# Patient Record
Sex: FEMALE | Race: WHITE | NOT HISPANIC OR LATINO | Employment: FULL TIME | URBAN - METROPOLITAN AREA
[De-identification: names, ages, dates, MRNs, and addresses within clinical notes are randomized per-mention and may not be internally consistent; named-entity substitution may affect disease eponyms.]

---

## 2017-04-26 ENCOUNTER — RADIANT APPOINTMENT (OUTPATIENT)
Dept: GENERAL RADIOLOGY | Facility: CLINIC | Age: 47
End: 2017-04-26
Attending: FAMILY MEDICINE
Payer: COMMERCIAL

## 2017-04-26 ENCOUNTER — OFFICE VISIT (OUTPATIENT)
Dept: FAMILY MEDICINE | Facility: CLINIC | Age: 47
End: 2017-04-26
Payer: COMMERCIAL

## 2017-04-26 VITALS
BODY MASS INDEX: 34.96 KG/M2 | TEMPERATURE: 98 F | OXYGEN SATURATION: 99 % | HEIGHT: 69 IN | SYSTOLIC BLOOD PRESSURE: 130 MMHG | RESPIRATION RATE: 16 BRPM | DIASTOLIC BLOOD PRESSURE: 84 MMHG | HEART RATE: 70 BPM | WEIGHT: 236 LBS

## 2017-04-26 DIAGNOSIS — M51.26 DISPLACEMENT OF LUMBAR INTERVERTEBRAL DISC WITHOUT MYELOPATHY: Primary | ICD-10-CM

## 2017-04-26 DIAGNOSIS — M25.551 HIP PAIN, BILATERAL: ICD-10-CM

## 2017-04-26 DIAGNOSIS — M25.859 FEMORAL ACETABULAR IMPINGEMENT: Primary | ICD-10-CM

## 2017-04-26 DIAGNOSIS — M25.552 HIP PAIN, BILATERAL: ICD-10-CM

## 2017-04-26 DIAGNOSIS — M54.30 SCIATICA, UNSPECIFIED LATERALITY: ICD-10-CM

## 2017-04-26 DIAGNOSIS — M25.561 PAIN IN BOTH KNEES, UNSPECIFIED CHRONICITY: ICD-10-CM

## 2017-04-26 DIAGNOSIS — M25.562 PAIN IN BOTH KNEES, UNSPECIFIED CHRONICITY: ICD-10-CM

## 2017-04-26 PROCEDURE — 99213 OFFICE O/P EST LOW 20 MIN: CPT | Performed by: FAMILY MEDICINE

## 2017-04-26 PROCEDURE — 73523 X-RAY EXAM HIPS BI 5/> VIEWS: CPT

## 2017-04-26 RX ORDER — IBUPROFEN 200 MG
200 TABLET ORAL EVERY 4 HOURS PRN
COMMUNITY
End: 2020-01-22

## 2017-04-26 NOTE — NURSING NOTE
"Chief Complaint   Patient presents with     Musculoskeletal Problem     pt has extensive history of back problems and has had back surgerys. Has seen spine specialists, completed PT, but was talking to brother who was having same problems- not being able to stand for long periods of time, cant sit for long periods of time, pain gets worse as the day goes on, bilateral hip pain. Would like to have x-rays of hips today        Initial /84 (BP Location: Right arm, Patient Position: Chair, Cuff Size: Adult Large)  Pulse 70  Temp 98  F (36.7  C) (Oral)  Resp 16  Ht 5' 9\" (1.753 m)  Wt 236 lb (107 kg)  SpO2 99%  BMI 34.85 kg/m2 Estimated body mass index is 34.85 kg/(m^2) as calculated from the following:    Height as of this encounter: 5' 9\" (1.753 m).    Weight as of this encounter: 236 lb (107 kg).  Medication Reconciliation: complete     Vero Rothman CMA      "

## 2017-04-26 NOTE — PROGRESS NOTES
SUBJECTIVE:                                                    Taylor Dunlap is a 46 year old female who presents to clinic today for the following health issues:    About 2 years ago started meggan and got knee pains so stopped doing this.   She then began to have more back issues.   She saw chiropractor and it did not help.   She saw another and that did not help.   Her knees kept hurting.   She got MRI on back and she was found to have 2 herniated disc in her back.   She got PT and it did help.   That was done about 6-8 months ago.   If she stands too long cooking or cleaning for too long then sitting afterwards it will really hurt.   Her brother mentioned that he had the same trouble before his hips were replaced at age late 40's.      pt has extensive history of back problems and has had back surgerys. Has seen spine specialists, completed PT, but was talking to brother who was having same problems- not being able to stand for long periods of time, cant sit for long periods of time, pain gets worse as the day goes on, bilateral hip pain. Would like to have x-rays of hips today         Joint Pain     Onset: 2 years ago, started with knees    Description:   Location: left knee, right knee, left hip and right hip    Character: Dull ache, can be a stabbing pain, usually in right leg    Intensity: mild    Progression of Symptoms: same    Accompanying Signs & Symptoms:  Other symptoms: radiation of pain to foot   History:   Previous similar pain: YES      Precipitating factors:   Trauma or overuse: no     Alleviating factors:  Improved by: Epison Son baths, Water areobics, excedrin or motrin       Therapies Tried and outcome: Epison Salt baths, being in the water, motrin      Past Medical History:   Diagnosis Date     NO ACTIVE PROBLEMS      Skin cancer, basal cell 2013       Past Surgical History:   Procedure Laterality Date     C NONSPECIFIC PROCEDURE  4/99    L4-5 laminectomy & microdiscectomy ([facility])  "      MEDICATIONS:  Current Outpatient Prescriptions   Medication     Aspirin-Acetaminophen-Caffeine (EXCEDRIN PO)     ibuprofen (MOTRIN IB) 200 MG tablet     No current facility-administered medications for this visit.        SOCIAL HISTORY:  Social History   Substance Use Topics     Smoking status: Current Every Day Smoker     Packs/day: 0.50     Years: 10.00     Types: Cigarettes     Last attempt to quit: 4/1/2009     Smokeless tobacco: Never Used     Alcohol use Yes      Comment: occasional; couple glasses wine on weekends.       Family History   Problem Relation Age of Onset     Arthritis Brother      CEREBROVASCULAR DISEASE Maternal Grandfather      C.A.D. Paternal Grandfather      MI; 50's     Arthritis Paternal Grandmother      RA     Brain Cancer Paternal Aunt      R.A     Arthritis Paternal Aunt        Objective:  Blood pressure 130/84, pulse 70, temperature 98  F (36.7  C), temperature source Oral, resp. rate 16, height 5' 9\" (1.753 m), weight 236 lb (107 kg), SpO2 99 %, unknown if currently breastfeeding.  Neck:  There is no lymphadenopathy or thyroid tenderness or enlargement  Chest: Clear to auscultation bilaterally.  No wheezes, rales or retractions.  CV: Regular rate and rhythm without murmurs, rubs or gallops.  Right Hip exam: the right hip has full range of motion with no pain.  There is no pain on internal rotation and none pain with external rotation.  There is no pain at the trochanteric bursa - the left hip exam is normal as well    Xray of both hips: some narrowing of the hip joint at inferior socket both sides but that is all    Assessment:  1. ? Early DJD of hips but no real pain here  2. Ongoing and chronic low back pain - reported herniated discs in lumbar spine  3.  Knee pain intermittent which is likely DJD as well  4. HCM - due for pap and CPE    Plan:  1. Refer to medical spine clinic for medical management   2. Will need to get copy of MRI for them  3.  Will let her know when we get " official reading of hip xray  4.  Schedule appt for CPE and pap sometime in next 1-2 months

## 2017-04-26 NOTE — MR AVS SNAPSHOT
After Visit Summary   4/26/2017    Taylor Dunlap    MRN: [id number]           Patient Information     Date Of Birth          1970        Visit Information        Provider Department      4/26/2017 11:15 AM Shereen Garcia MD [facility]        Today's Diagnoses     Displacement of lumbar intervertebral disc without myelopathy    -  1    Hip pain, bilateral           Follow-ups after your visit        Additional Services     ORTHO  REFERRAL       [facility] is referring you to the Orthopedic  Services at [facility].       The  Representative will assist you in the coordination of your Orthopedic and Musculoskeletal Care as prescribed by your physician.    The  Representative will call you within 1 business day to help schedule your appointment, or you may contact the  Representative at:    All areas ~ [phone]     Type of Referral : Spine: Lumbar  **Choose Medical Spine Specialist (unless patient was seen by a Medical Spine Specialist within the past 6 months).**  Surgical Evaluation is advised if the patient presents with one or more of the following red flags: Evidence of Spinal Tumor, Infection or Fracture, Cauda Equina Syndrome, Sudden or Progressive Weakness, Loss of Bowel or Bladder Control, or any other documented emergent neurological condition resulting from a Lumbar Spinal Condition. Medical Spine Specialist        Timeframe requested: Within 2 weeks    Coverage of these services is subject to the terms and limitations of your health insurance plan.  Please call member services at your health plan with any benefit or coverage questions.      If X-rays, CT or MRI's have been performed, please contact the facility where they were done to arrange for , prior to your scheduled appointment.  Please bring this referral request to your appointment and present it to your specialist.    "               Who to contact     If you have questions or need follow up information about today's clinic visit or your schedule please contact [facility] directly at [phone].  Normal or non-critical lab and imaging results will be communicated to you by MyChart, letter or phone within 4 business days after the clinic has received the results. If you do not hear from us within 7 days, please contact the clinic through MyChart or phone. If you have a critical or abnormal lab result, we will notify you by phone as soon as possible.  Submit refill requests through [employer] or call your pharmacy and they will forward the refill request to us. Please allow 3 business days for your refill to be completed.          Additional Information About Your Visit        [employer] Information     [employer] gives you secure access to your electronic health record. If you see a primary care provider, you can also send messages to your care team and make appointments. If you have questions, please call your primary care clinic.  If you do not have a primary care provider, please call [phone] and they will assist you.        Care EveryWhere ID     This is your Care EveryWhere ID. This could be used by other organizations to access your [place] medical records  [id number]        Your Vitals Were     Pulse Temperature Respirations Height Pulse Oximetry BMI (Body Mass Index)    70 98  F (36.7  C) (Oral) 16 5' 9\" (1.753 m) 99% 34.85 kg/m2       Blood Pressure from Last 3 Encounters:   04/26/17 130/84   07/10/13 110/78   03/20/12 124/84    Weight from Last 3 Encounters:   04/26/17 236 lb (107 kg)   07/10/13 200 lb (90.7 kg)   03/20/12 224 lb (101.6 kg)              We Performed the Following     ORTHO  REFERRAL        Primary Care Provider Office Phone # Fax #    Shereen Garcia [phone]       [facility] [address] [facility] [address]        Thank you!     Thank " you for choosing [facility]  for your care. Our goal is always to provide you with excellent care. Hearing back from our patients is one way we can continue to improve our services. Please take a few minutes to complete the written survey that you may receive in the mail after your visit with us. Thank you!             Your Updated Medication List - Protect others around you: Learn how to safely use, store and throw away your medicines at www.disposemymeds.org.          This list is accurate as of: 4/26/17 12:22 PM.  Always use your most recent med list.                   Brand Name Dispense Instructions for use    EXCEDRIN PO          MOTRIN  MG tablet   Generic drug:  ibuprofen      Take 200 mg by mouth every 4 hours as needed for mild pain

## 2017-04-28 ENCOUNTER — OFFICE VISIT (OUTPATIENT)
Dept: ORTHOPEDICS | Facility: CLINIC | Age: 47
End: 2017-04-28
Payer: COMMERCIAL

## 2017-04-28 VITALS
HEIGHT: 69 IN | DIASTOLIC BLOOD PRESSURE: 92 MMHG | SYSTOLIC BLOOD PRESSURE: 142 MMHG | BODY MASS INDEX: 34.96 KG/M2 | WEIGHT: 236 LBS

## 2017-04-28 DIAGNOSIS — M54.16 LUMBAR RADICULOPATHY: Primary | ICD-10-CM

## 2017-04-28 PROCEDURE — 99203 OFFICE O/P NEW LOW 30 MIN: CPT | Performed by: ORTHOPAEDIC SURGERY

## 2017-04-28 NOTE — MR AVS SNAPSHOT
After Visit Summary   4/28/2017    Taylor Dunlap    MRN: [id number]           Patient Information     Date Of Birth          1970        Visit Information        Provider Department      4/28/2017 2:40 PM Gasper Jeffery MD [facility] ORTHOPEDIC SURGERY        Today's Diagnoses     Lumbar radiculopathy    -  1      Care Instructions    You will want to bring copy of your MRI on disc for your appointment        Follow-ups after your visit        Additional Services     ORTHO  REFERRAL       [facility] is referring you to the Orthopedic  Services at [facility].       The  Representative will assist you in the coordination of your Orthopedic and Musculoskeletal Care as prescribed by your physician.    The  Representative will call you within 1 business day to help schedule your appointment, or you may contact the  Representative at:    All areas ~ [phone]     Type of Referral : Spine: Lumbar  **Choose Medical Spine Specialist (unless patient was seen by a Medical Spine Specialist within the past 6 months).**  Surgical Evaluation is advised if the patient presents with one or more of the following red flags: Evidence of Spinal Tumor, Infection or Fracture, Cauda Equina Syndrome, Sudden or Progressive Weakness, Loss of Bowel or Bladder Control, or any other documented emergent neurological condition resulting from a Lumbar Spinal Condition. Spine Surgeon  - Dr Healy      Timeframe requested: Within 2 weeks    Coverage of these services is subject to the terms and limitations of your health insurance plan.  Please call member services at your health plan with any benefit or coverage questions.      If X-rays, CT or MRI's have been performed, please contact the facility where they were done to arrange for , prior to your scheduled appointment.  Please bring this referral request to your  "appointment and present it to your specialist.                  Who to contact     If you have questions or need follow up information about today's clinic visit or your schedule please contact [facility] ORTHOPEDIC SURGERY directly at [phone].  Normal or non-critical lab and imaging results will be communicated to you by MyChart, letter or phone within 4 business days after the clinic has received the results. If you do not hear from us within 7 days, please contact the clinic through MyChart or phone. If you have a critical or abnormal lab result, we will notify you by phone as soon as possible.  Submit refill requests through [employer] or call your pharmacy and they will forward the refill request to us. Please allow 3 business days for your refill to be completed.          Additional Information About Your Visit        [employer] Information     [employer] gives you secure access to your electronic health record. If you see a primary care provider, you can also send messages to your care team and make appointments. If you have questions, please call your primary care clinic.  If you do not have a primary care provider, please call [phone] and they will assist you.        Care EveryWhere ID     This is your Care EveryWhere ID. This could be used by other organizations to access your [place] medical records  [id number]        Your Vitals Were     Height BMI (Body Mass Index)                5' 9\" (1.753 m) 34.85 kg/m2           Blood Pressure from Last 3 Encounters:   04/28/17 (!) 142/92   04/26/17 130/84   07/10/13 110/78    Weight from Last 3 Encounters:   04/28/17 236 lb (107 kg)   04/26/17 236 lb (107 kg)   07/10/13 200 lb (90.7 kg)              We Performed the Following     ORTHO  REFERRAL        Primary Care Provider Office Phone # Fax #    Shereen Garcia [phone]       [facility] [id number] [place] [id number]        Thank you!     Thank you for " choosing [facility] ORTHOPEDIC SURGERY  for your care. Our goal is always to provide you with excellent care. Hearing back from our patients is one way we can continue to improve our services. Please take a few minutes to complete the written survey that you may receive in the mail after your visit with us. Thank you!             Your Updated Medication List - Protect others around you: Learn how to safely use, store and throw away your medicines at www.disposemymeds.org.          This list is accurate as of: 4/28/17  3:01 PM.  Always use your most recent med list.                   Brand Name Dispense Instructions for use    EXCEDRIN PO          MOTRIN  MG tablet   Generic drug:  ibuprofen      Take 200 mg by mouth every 4 hours as needed for mild pain Reported on 4/28/2017

## 2017-04-28 NOTE — LETTER
4/28/2017       RE: Taylor Dunlap  [address]  [place] [address]           Dear Colleague,    Thank you for referring your patient, Taylor Dunlap, to the [facility] ORTHOPEDIC SURGERY. Please see a copy of my visit note below.    HISTORY OF PRESENT ILLNESS:    Taylor Dunlap is a 46 year old female who is seen in consultation at the request of Dr. Garcia for leg pain.    Present symptoms: Pt reports hx of back surgery and continued pain into her legs.  Pt states most recent flare has been present for about 2 years.  Pt states radicular symptoms with vary side to side states currently symptoms are on the right side, start in the buttock region will go down the lateral leg into the calf and foot. Pt reports having numbness/ tingling in the foot as well.  Pt states she did try PT with out much relief.  Treatments tried to this point: OTC Medication:  Ibuprofen (Advil), Excedrin  Other care:  Chiropractic Physical Therapy; MRI ([facility] 2016)  Orthopedic PMH: back surgery - 1999     Past Medical History:   Diagnosis Date     NO ACTIVE PROBLEMS      Skin cancer, basal cell 2013       Past Surgical History:   Procedure Laterality Date     C NONSPECIFIC PROCEDURE  4/99    L4-5 laminectomy & microdiscectomy ([facility])       Family History   Problem Relation Age of Onset     Arthritis Brother      CEREBROVASCULAR DISEASE Maternal Grandfather      C.A.D. Paternal Grandfather      MI; 50's     Arthritis Paternal Grandmother      RA     Brain Cancer Paternal Aunt      R.A     Arthritis Paternal Aunt        Social History     Social History     Marital status:      Spouse name: N/A     Number of children: N/A     Years of education: N/A     Occupational History      Self     Social History Main Topics     Smoking status: Current Every Day Smoker     Packs/day: 0.50     Years: 10.00     Types: Cigarettes     Last attempt to quit: 4/1/2009     Smokeless tobacco: Never Used     Alcohol use Yes  "     Comment: occasional; couple glasses wine on weekends.     Drug use: No     Sexual activity: Yes     Partners: Male     Birth control/ protection: Surgical      Comment: vasectomy     Other Topics Concern     Special Diet No     trying to eating healthy;  dairy.     Exercise Yes     walking as able. exercises with back pain     Social History Narrative       Current Outpatient Prescriptions   Medication Sig Dispense Refill     Aspirin-Acetaminophen-Caffeine (EXCEDRIN PO)        ibuprofen (MOTRIN IB) 200 MG tablet Take 200 mg by mouth every 4 hours as needed for mild pain         Allergies   Allergen Reactions     No Known Drug Allergies        REVIEW OF SYSTEMS:  CONSTITUTIONAL:  NEGATIVE for fever, chills, change in weight  INTEGUMENTARY/SKIN:  NEGATIVE for worrisome rashes, moles or lesions  EYES:  NEGATIVE for vision changes or irritation  ENT/MOUTH:  NEGATIVE for ear, mouth and throat problems  RESP:  NEGATIVE for significant cough or SOB  BREAST:  NEGATIVE for masses, tenderness or discharge  CV:  NEGATIVE for chest pain, palpitations or peripheral edema  GI:  NEGATIVE for nausea, abdominal pain, heartburn, or change in bowel habits  :  Negative   MUSCULOSKELETAL:  See HPI above  NEURO:  NEGATIVE for weakness, dizziness or paresthesias  ENDOCRINE:  NEGATIVE for temperature intolerance, skin/hair changes  HEME/ALLERGY/IMMUNE:  NEGATIVE for bleeding problems  PSYCHIATRIC:  NEGATIVE for changes in mood or affect      PHYSICAL EXAM:  Ht 5' 9\" (1.753 m)  Wt 236 lb (107 kg)  BMI 34.85 kg/m2  Body mass index is 34.85 kg/(m^2).   GENERAL APPEARANCE: healthy, alert and no distress   SKIN: no suspicious lesions or rashes  NEURO: Normal strength and tone, mentation intact and speech normal  VASCULAR: Good pulses, and capillary refill   LYMPH: no lymphadenopathy   PSYCH:  mentation appears normal and affect normal/bright    MSK:  A&OX3, NAD  Neck supple, no lymphadenopathy  The patient ambulates without an antalgic " gait.  The patient is able to get on and off the exam table without difficulty.    Examination of the spine reveals a normal lordosis to the cervical and lumbar spine, and a normal kyphosis to the thoracic spine.  There is no clinical evidence of scoliosis.    The pelvis is clinically level.  Trendelenberg is negative.  The patient is non-tender to palpation over the greater trochanteric bursal region or piriformis fossa.  With the hip flexed to 90 degrees, internal and external rotation is 30/60 respectively,  with no pain.  The calves and thighs are symmetric, without atrophy and non-tender to palpation. Fracisco's sign is negative, bilaterally.   CMS is intact to the toes.  5/5 manual muscle testing with the exception of EHLs are possibly 4/5 bilaterally.     ASSESSMENT / PLAN: Degenerative lumbar disc disease status post L4 5 discectomy in 1999. An MRI from last year reveals central and foraminal stenosis at L4 5. I'm going to refer her to spine surgeon in the area for his consideration.    Imaging Interpretation:         Robe Jeffery MD  Department of Orthopedic Surgery        Disclaimer: This note consists of symbols derived from keyboarding, dictation and/or voice recognition software. As a result, there may be errors in the script that have gone undetected. Please consider this when interpreting information found in this chart.      Again, thank you for allowing me to participate in the care of your patient.        Sincerely,              Gasper Jeffery MD

## 2017-04-28 NOTE — NURSING NOTE
"Chief Complaint   Patient presents with     Back Pain     lumbar radiculopathy, leg pain       Initial BP (!) 142/92 (BP Location: Right arm, Patient Position: Chair, Cuff Size: Adult Regular)  Ht 5' 9\" (1.753 m)  Wt 236 lb (107 kg)  BMI 34.85 kg/m2 Estimated body mass index is 34.85 kg/(m^2) as calculated from the following:    Height as of this encounter: 5' 9\" (1.753 m).    Weight as of this encounter: 236 lb (107 kg).  Medication Reconciliation: complete    "

## 2017-04-28 NOTE — PROGRESS NOTES
HISTORY OF PRESENT ILLNESS:    Taylor Dunlap is a 46 year old female who is seen in consultation at the request of Dr. Garcia for leg pain.    Present symptoms: Pt reports hx of back surgery and continued pain into her legs.  Pt states most recent flare has been present for about 2 years.  Pt states radicular symptoms with vary side to side states currently symptoms are on the right side, start in the buttock region will go down the lateral leg into the calf and foot. Pt reports having numbness/ tingling in the foot as well.  Pt states she did try PT with out much relief.  Treatments tried to this point: OTC Medication:  Ibuprofen (Advil), Excedrin  Other care:  Chiropractic Physical Therapy; MRI ([facility] 2016)  Orthopedic PMH: back surgery - 1999     Past Medical History:   Diagnosis Date     NO ACTIVE PROBLEMS      Skin cancer, basal cell 2013       Past Surgical History:   Procedure Laterality Date     C NONSPECIFIC PROCEDURE  4/99    L4-5 laminectomy & microdiscectomy ([facility])       Family History   Problem Relation Age of Onset     Arthritis Brother      CEREBROVASCULAR DISEASE Maternal Grandfather      CPatriciaAMEET. Paternal Grandfather      MI; 50's     Arthritis Paternal Grandmother      RA     Brain Cancer Paternal Aunt      R.A     Arthritis Paternal Aunt        Social History     Social History     Marital status:      Spouse name: N/A     Number of children: N/A     Years of education: N/A     Occupational History      Self     Social History Main Topics     Smoking status: Current Every Day Smoker     Packs/day: 0.50     Years: 10.00     Types: Cigarettes     Last attempt to quit: 4/1/2009     Smokeless tobacco: Never Used     Alcohol use Yes      Comment: occasional; couple glasses wine on weekends.     Drug use: No     Sexual activity: Yes     Partners: Male     Birth control/ protection: Surgical      Comment: vasectomy     Other Topics Concern     Special Diet No     trying to eating  "healthy;  dairy.     Exercise Yes     walking as able. exercises with back pain     Social History Narrative       Current Outpatient Prescriptions   Medication Sig Dispense Refill     Aspirin-Acetaminophen-Caffeine (EXCEDRIN PO)        ibuprofen (MOTRIN IB) 200 MG tablet Take 200 mg by mouth every 4 hours as needed for mild pain         Allergies   Allergen Reactions     No Known Drug Allergies        REVIEW OF SYSTEMS:  CONSTITUTIONAL:  NEGATIVE for fever, chills, change in weight  INTEGUMENTARY/SKIN:  NEGATIVE for worrisome rashes, moles or lesions  EYES:  NEGATIVE for vision changes or irritation  ENT/MOUTH:  NEGATIVE for ear, mouth and throat problems  RESP:  NEGATIVE for significant cough or SOB  BREAST:  NEGATIVE for masses, tenderness or discharge  CV:  NEGATIVE for chest pain, palpitations or peripheral edema  GI:  NEGATIVE for nausea, abdominal pain, heartburn, or change in bowel habits  :  Negative   MUSCULOSKELETAL:  See HPI above  NEURO:  NEGATIVE for weakness, dizziness or paresthesias  ENDOCRINE:  NEGATIVE for temperature intolerance, skin/hair changes  HEME/ALLERGY/IMMUNE:  NEGATIVE for bleeding problems  PSYCHIATRIC:  NEGATIVE for changes in mood or affect      PHYSICAL EXAM:  Ht 5' 9\" (1.753 m)  Wt 236 lb (107 kg)  BMI 34.85 kg/m2  Body mass index is 34.85 kg/(m^2).   GENERAL APPEARANCE: healthy, alert and no distress   SKIN: no suspicious lesions or rashes  NEURO: Normal strength and tone, mentation intact and speech normal  VASCULAR: Good pulses, and capillary refill   LYMPH: no lymphadenopathy   PSYCH:  mentation appears normal and affect normal/bright    MSK:  A&OX3, NAD  Neck supple, no lymphadenopathy  The patient ambulates without an antalgic gait.  The patient is able to get on and off the exam table without difficulty.    Examination of the spine reveals a normal lordosis to the cervical and lumbar spine, and a normal kyphosis to the thoracic spine.  There is no clinical evidence of " scoliosis.    The pelvis is clinically level.  Trendelenberg is negative.  The patient is non-tender to palpation over the greater trochanteric bursal region or piriformis fossa.  With the hip flexed to 90 degrees, internal and external rotation is 30/60 respectively,  with no pain.  The calves and thighs are symmetric, without atrophy and non-tender to palpation. Fracisco's sign is negative, bilaterally.   CMS is intact to the toes.  5/5 manual muscle testing with the exception of EHLs are possibly 4/5 bilaterally.     ASSESSMENT / PLAN: Degenerative lumbar disc disease status post L4 5 discectomy in 1999. An MRI from last year reveals central and foraminal stenosis at L4 5. I'm going to refer her to spine surgeon in the area for his consideration.    Imaging Interpretation:         Robe Jeffery MD  Department of Orthopedic Surgery        Disclaimer: This note consists of symbols derived from keyboarding, dictation and/or voice recognition software. As a result, there may be errors in the script that have gone undetected. Please consider this when interpreting information found in this chart.

## 2017-05-04 ENCOUNTER — OFFICE VISIT (OUTPATIENT)
Dept: ORTHOPEDICS | Facility: CLINIC | Age: 47
End: 2017-05-04
Payer: COMMERCIAL

## 2017-05-04 VITALS
WEIGHT: 230 LBS | SYSTOLIC BLOOD PRESSURE: 142 MMHG | DIASTOLIC BLOOD PRESSURE: 86 MMHG | HEIGHT: 69 IN | BODY MASS INDEX: 34.07 KG/M2

## 2017-05-04 DIAGNOSIS — Z98.890 HISTORY OF LUMBAR LAMINECTOMY: ICD-10-CM

## 2017-05-04 DIAGNOSIS — M51.16 LUMBAR DISC HERNIATION WITH RADICULOPATHY: Primary | ICD-10-CM

## 2017-05-04 PROCEDURE — 99204 OFFICE O/P NEW MOD 45 MIN: CPT | Performed by: FAMILY MEDICINE

## 2017-05-04 NOTE — MR AVS SNAPSHOT
After Visit Summary   5/4/2017    Taylor Dunlap    MRN: [id number]           Patient Information     Date Of Birth          1970        Visit Information        Provider Department      5/4/2017 3:00 PM Jeff García DO [facility]        Today's Diagnoses     Lumbar disc herniation with radiculopathy    -  1    History of lumbar laminectomy          Care Instructions    Thank you for allowing us to participate in your care today.  Please find below your visit diagnosis and the plan going forward.    1. Lumbar disc herniation with radiculopathy    2. History of lumbar laminectomy      Discussed MRI which shows a disc herniation at L4-5 which correlates to your symptoms of radiating pain into the right leg  Given history of prior surgery recommend referral to Dr. Healy. Would likely pursue injection and / or PT initially prior to addressing surgically.    Follow up as needed. Call direct clinic number [[phone]] at any time with questions or concerns.    Jeff García DO [facility] [place]  Website: [email or web address]  Twitter: [email or web address]          Follow-ups after your visit        Additional Services     ORTHO  REFERRAL       [facility] is referring you to the Orthopedic  Services at [facility] [place] [facility].       The  Representative will assist you in the coordination of your Orthopedic and Musculoskeletal Care as prescribed by your physician.    The  Representative will call you within 24 hours to help schedule your appointment, or you may contact the  Representative at:    [facility] ~ [phone]  [facility] ~ [phone]  [facility] ~ [phone]    Type of Referral : Spine: Lumbar Spine Surgeon  - Dr. Healy - lumbar herniation w/ right L5 radic. Hx of laminectomy at L4-5       Timeframe requested:  "routine    Coverage of these services is subject to the terms and limitations of your health insurance plan.  Please call member services at your health plan with any benefit or coverage questions.      If X-rays, CT or MRI's have been performed, please contact the facility where they were done to arrange for , prior to your scheduled appointment.  Please bring this referral request to your appointment and present it to your specialist.                  Who to contact     If you have questions or need follow up information about today's clinic visit or your schedule please contact [facility] directly at [phone].  Normal or non-critical lab and imaging results will be communicated to you by [employer], letter or phone within 4 business days after the clinic has received the results. If you do not hear from us within 7 days, please contact the clinic through [employer] or phone. If you have a critical or abnormal lab result, we will notify you by phone as soon as possible.  Submit refill requests through [employer] or call your pharmacy and they will forward the refill request to us. Please allow 3 business days for your refill to be completed.          Additional Information About Your Visit        MyChart Information     [employer] gives you secure access to your electronic health record. If you see a primary care provider, you can also send messages to your care team and make appointments. If you have questions, please call your primary care clinic.  If you do not have a primary care provider, please call [phone] and they will assist you.        Care EveryWhere ID     This is your Care EveryWhere ID. This could be used by other organizations to access your [facility] medical records  [id number]        Your Vitals Were     Height BMI (Body Mass Index)                5' 9\" (1.753 m) 33.97 kg/m2           Blood Pressure from Last 3 Encounters:   05/04/17 142/86   04/28/17 (!) 142/92 "   04/26/17 130/84    Weight from Last 3 Encounters:   05/04/17 230 lb (104.3 kg)   04/28/17 236 lb (107 kg)   04/26/17 236 lb (107 kg)              We Performed the Following     ORTHO  REFERRAL        Primary Care Provider Office Phone # Fax #    Shereen Garcia [phone]       [facility] [address] [facility] [address]        Thank you!     Thank you for choosing [facility]  for your care. Our goal is always to provide you with excellent care. Hearing back from our patients is one way we can continue to improve our services. Please take a few minutes to complete the written survey that you may receive in the mail after your visit with us. Thank you!             Your Updated Medication List - Protect others around you: Learn how to safely use, store and throw away your medicines at www.disposemymeds.org.          This list is accurate as of: 5/4/17  3:36 PM.  Always use your most recent med list.                   Brand Name Dispense Instructions for use    EXCEDRIN PO          MOTRIN  MG tablet   Generic drug:  ibuprofen      Take 200 mg by mouth every 4 hours as needed for mild pain Reported on 4/28/2017       TYLENOL PO

## 2017-05-04 NOTE — NURSING NOTE
"Chief Complaint   Patient presents with     Musculoskeletal Problem       Initial /86  Ht 5' 9\" (1.753 m)  Wt 230 lb (104.3 kg)  BMI 33.97 kg/m2 Estimated body mass index is 33.97 kg/(m^2) as calculated from the following:    Height as of this encounter: 5' 9\" (1.753 m).    Weight as of this encounter: 230 lb (104.3 kg).  Medication Reconciliation: complete     Moris Blount ATC    "

## 2017-05-04 NOTE — PATIENT INSTRUCTIONS
Thank you for allowing us to participate in your care today.  Please find below your visit diagnosis and the plan going forward.    1. Lumbar disc herniation with radiculopathy    2. History of lumbar laminectomy      Discussed MRI which shows a disc herniation at L4-5 which correlates to your symptoms of radiating pain into the right leg  Given history of prior surgery recommend referral to Dr. Healy. Would likely pursue injection and / or PT initially prior to addressing surgically.    Follow up as needed. Call direct clinic number [[phone]] at any time with questions or concerns.    Jeff García DO CAQSM  [facility]  Website: [email or web address]  Twitter: [email or web address]

## 2017-05-04 NOTE — PROGRESS NOTES
ASSESSMENT & PLAN    ICD-10-CM    1. Lumbar disc herniation with radiculopathy M51.16 ORTHO  REFERRAL   2. History of lumbar laminectomy Z98.890 ORTHO  REFERRAL   Discussed prolonged history of pain, initially intermittent and now more persistent  Exam and sensory changes over R L5 dermatome appear consistent with MRI findings  No current strength deficits.  Given previous history of laminectomy recommend referral to Dr. Healy. May recommend trail of PT and KARL prior to surgical recommendations.    Follow up as needed. Call direct clinic number [phone] at any time with questions or concerns. Instructed to call the office if the condition evolves or worsens.    -----    SUBJECTIVE  Taylor Dunlap is a/an 46 year old female who is seen in consultation at the request of Dr. Jeffery for evaluation of low back pain. The patient is seen by themselves. She was initially referred to Dr. Healy by Dr. Jeffery, but her insurance requires a  approved provider first. She reports alternating bilateral radiating pain from the back to the first two toes. Most of her pain seems to be around her knee area.    Onset: 24 years(s) ago with the most recent onset of 2 years ago. Reports insidious onset without acute precipitating event, after first son was born. The recent pain began after exercising.  Was intermittent initially which is reason for delayed treatement/physician evaluation but has become persistent in not resoved with oral medications and activity reduction.  Worsened by: sitting  Better with: moving around  Quality: aching, dull, stabbing, intermittent  Pain Scale (maximum/current)/10: 7/10 / 1/10  Treatments tried: ice, heat, Tylenol, physical therapy (years ago) and previous imaging (MRI and xray see below)  Red flags: Weakness: No, bowel/bladder loss: No, foot drop: No  Orthopedic history: YES - Date: 24 years ago  Relevant surgical history: YES - Date: 1999 - L4-5 laminectomy  Patient Social  "History: works as     Patient's past medical, surgical, social, and family histories were reviewed today and no changes are noted.    REVIEW OF SYSTEMS:  10 point ROS is negative other than symptoms noted above in HPI, Past Medical History or as stated below  Constitutional: NEGATIVE for fever, chills, change in weight  Skin: NEGATIVE for worrisome rashes, moles or lesions  GI/: NEGATIVE for bowel or bladder changes  Neuro: NEGATIVE for weakness, dizziness or paresthesias    OBJECTIVE:  /86  Ht 5' 9\" (1.753 m)  Wt 230 lb (104.3 kg)  BMI 33.97 kg/m2   General: healthy, alert and in no distress  HEENT: no scleral icterus or conjunctival erythema  Skin: no suspicious lesions or rash. No jaundice.  CV: no pedal edema  Resp: normal respiratory effort without conversational dyspnea   Psych: normal mood and affect  Gait: normal steady gait with appropriate coordination and balance  Neuro: decreased sensation over R L5 dermatome otherwise normal light touch sensory exam of the bilateral lower extremities.  Motor strength as noted below. DTR's 2+ patella and achilles bilaterally.  MSK:  THORACIC/LUMBAR SPINE  Inspection:    No gross deformity/asymmetry  Palpation:    Tender about the lumbar facet joints, left SI joint and left sciatic notch. Otherwise remainder of landmarks are nontender.  Range of Motion:     Lumbar flexion limited by pain    Lumbar extension limited by pain  Strength:    able to heel walk but painful, able to toe walk, quadriceps 5/5, hamstrings 5/5, gastrocsoleus 5/5, tibialis anterior 5/5, extensor hallicus longus 5/5  Special Tests:    Positive: slump test (right)    RIGHT HIP  Inspection:    pelvis level  Active Range of Motion:     Flexion full, IR full, ER  full  Special Tests:    Negative: anterior impingement (FADIR)    Independent visualization of the below image:  XR PELVIS AND HIP BILATERAL 2 VIEWS 4/26/2017 12:09 PM     COMPARISON: None.     HISTORY: Bilateral hip " pain.         IMPRESSION: Mild bilateral acetabular over coverage, suggests pincer  type femoral acetabular impingement. No fractures are seen on either  side. Joint spaces are preserved.     NICOLASA WELCH MRI 8/3/2016  IMPRESSION:  1. There are changes of prior hemilaminectomy at the L4-5 level and there is circumferential disc bulge with slight protrusion into the lateral recesses and potential mass effect on the traversing nerve roots. There is also mild bilateral neural   foraminal narrowing. Correlate for right L5 radicular symptoms. No priors available for comparison.  2. There is distal anterior and extraforaminal protrusion at the L3-4 level on the left with potential mass effect on the left L3 nerve root. Other milder degenerative changes are as described above.         Result Narrative   INDICATION: bilateral l4-5 symptoms (R>L) h/o L4-5 laminectomy      TECHNIQUE:  MRI of the lumbar spine with and without contrast, 11 mL  GADOBUTROL 7.5 MMOL/7.5 ML (1 MMOL/ML) INTRAVENOUS SOLUTION.        COMPARISON:  None.           FINDINGS:    Sagittal:  Five lumbar-type vertebral bodies.  The conus medullaris terminates at the level of L2. Normal cord signal.  Normal marrow signal. There is a mild levoscoliosis. Normal enhancement. The visualized paraspinal structures are unremarkable.     Axial:    T12-L1: Unremarkable.     L1-2: Unremarkable.     There is circumferential disc bulge without significant mass effect.    L3-4: There is distal anterior and extraforaminal protrusion at the L3-4 level on the left with potential mass effect on the left L3 nerve root    L4-5: There is circumferential disc bulge mildly effacing the lateral recesses and the disc is mildly caudally extruded. There is mild bilateral neural foraminal narrowing. There are changes of prior hemilaminectomy.    L5-S1: There is mild to moderate facet arthropathy.     Patient's conditions were thoroughly discussed during today's visit with greater  than 50% of the visit spent counseling the patient with total time spent face-to-face with the patient being 20 minutes.    Jeff García DO [facility]

## 2017-05-09 DIAGNOSIS — M54.16 LUMBAR RADICULOPATHY: Primary | ICD-10-CM

## 2017-05-10 ENCOUNTER — HOSPITAL ENCOUNTER (OUTPATIENT)
Dept: MRI IMAGING | Facility: CLINIC | Age: 47
Discharge: HOME | End: 2017-05-10
Attending: NEUROLOGICAL SURGERY | Admitting: NEUROLOGICAL SURGERY
Payer: COMMERCIAL

## 2017-05-10 ENCOUNTER — OFFICE VISIT (OUTPATIENT)
Dept: NEUROSURGERY | Facility: CLINIC | Age: 47
End: 2017-05-10
Attending: NEUROLOGICAL SURGERY
Payer: COMMERCIAL

## 2017-05-10 VITALS
OXYGEN SATURATION: 99 % | SYSTOLIC BLOOD PRESSURE: 142 MMHG | BODY MASS INDEX: 34.07 KG/M2 | TEMPERATURE: 97.5 F | DIASTOLIC BLOOD PRESSURE: 90 MMHG | HEIGHT: 69 IN | WEIGHT: 230 LBS | HEART RATE: 71 BPM

## 2017-05-10 DIAGNOSIS — M54.16 LUMBAR RADICULOPATHY: Primary | ICD-10-CM

## 2017-05-10 DIAGNOSIS — M48.061 SPINAL STENOSIS, LUMBAR REGION, WITHOUT NEUROGENIC CLAUDICATION: ICD-10-CM

## 2017-05-10 DIAGNOSIS — M54.16 LUMBAR RADICULOPATHY: ICD-10-CM

## 2017-05-10 DIAGNOSIS — M51.369 DDD (DEGENERATIVE DISC DISEASE), LUMBAR: ICD-10-CM

## 2017-05-10 PROCEDURE — 72148 MRI LUMBAR SPINE W/O DYE: CPT

## 2017-05-10 PROCEDURE — 99211 OFF/OP EST MAY X REQ PHY/QHP: CPT | Performed by: NEUROLOGICAL SURGERY

## 2017-05-10 PROCEDURE — 99203 OFFICE O/P NEW LOW 30 MIN: CPT | Performed by: NEUROLOGICAL SURGERY

## 2017-05-10 ASSESSMENT — PAIN SCALES - GENERAL: PAINLEVEL: MILD PAIN (3)

## 2017-05-10 NOTE — NURSING NOTE
"Taylor Dunlap is a 46 year old female who presents for:  Chief Complaint   Patient presents with     Neurologic Problem     lumbar radiculopathy, pain & tingling down both legs        Initial Vitals:  /90 (BP Location: Right arm, Patient Position: Chair, Cuff Size: Adult Large)  Pulse 71  Temp 97.5  F (36.4  C)  Ht 5' 9\" (1.753 m)  Wt 230 lb (104.3 kg)  SpO2 99%  BMI 33.97 kg/m2 Estimated body mass index is 33.97 kg/(m^2) as calculated from the following:    Height as of this encounter: 5' 9\" (1.753 m).    Weight as of this encounter: 230 lb (104.3 kg).. Body surface area is 2.25 meters squared. BP completed using cuff size: large  Mild Pain (3)    Do you feel safe in your environment?  Yes  Do you need any refills today? No    Nursing Comments: lumbar radiculopathy, pain & tingling down both legs.  Patient rates low back pain today as 3.      5 min. nursing intake time  Lakeshia Conklin CMA      Discharge plan:    -referral for injection to [facility]. They will contact you within 24 hours to schedule injection.   -Please call if you wish to proceed with surgery at [phone].     2 min. nursing discharge time  Lakeshia Conklin CMA    "

## 2017-05-10 NOTE — PATIENT INSTRUCTIONS
-referral for injection to [facility]. They will contact you within 24 hours to schedule injection.   -Please call if you wish to proceed with surgery at [phone].

## 2017-05-10 NOTE — MR AVS SNAPSHOT
After Visit Summary   5/10/2017    Taylor Dunlap    MRN: [id number]           Patient Information     Date Of Birth          1970        Visit Information        Provider Department      5/10/2017 1:40 PM Howard Healy MD [facility]        Today's Diagnoses     Lumbar radiculopathy    -  1      Care Instructions    -referral for injection to [facility]. They will contact you within 24 hours to schedule injection.   -Please call if you wish to proceed with surgery at [phone].         Follow-ups after your visit        Additional Services     PAIN MANAGEMENT CENTER ([place]) REFERRAL       Your provider has referred you to the [facility].    Reason for Referral: Procedure or injection only - patient will be contacted within 24 hrs to schedule: Epidural Steroid (interlaminar approach): Lumbar L4-5    Please complete the following questions:    What is your diagnosis for the patient's pain? Lumbar radiculopathy    Do you have any specific questions for the pain specialist? No    Are there any red flags that may impact the assessment or management of the patient? None    **ANY DIAGNOSTIC TESTS THAT ARE NOT IN EPIC SHOULD BE SENT TO THE PAIN CENTER**    Please note the Pre-Op Pain Consult must be scheduled 2-3 weeks prior to the patient's surgery.  Patient's trying to schedule within 2 weeks of surgery may not be accommodated.     Pre-Op Pain Consults are only good for 30 days.    REGARDING OPIOID MEDICATIONS:  We will always address appropriateness of opioid pain medications, but we generally will not automatically take on a prescribing role. When we do take on prescribing of opioids for chronic pain, it is in collaboration with the referring physician for an intermediate period of time (months), with an expectation that the primary physician or provider will assume the prescribing role if medications are effective at stable  doses with demonstrated compliance.  Therefore, please do not assume that your prescribing responsibilities end on the day of pain clinic consultation.  Is this agreeable to you? YES    For any questions, contact the [facility] at [phone].    Please be aware that coverage of these services is subject to the terms and limitations of your health insurance plan.  Call member services at your health plan with any benefit or coverage questions.      Please bring the following with you to your appointment:    (1) Any X-Rays, CTs or MRIs which have been performed.  Contact the facility where they were done to arrange for  prior to your scheduled appointment.    (2) List of current medications   (3) This referral request   (4) Any documents/labs given to you for this referral                  Future tests that were ordered for you today     Open Future Orders        Priority Expected Expires Ordered    MR Lumbar Spine w/o Contrast Routine  5/9/2018 5/9/2017            Who to contact     If you have questions or need follow up information about today's clinic visit or your schedule please contact [facility] directly at [phone].  Normal or non-critical lab and imaging results will be communicated to you by [employer], letter or phone within 4 business days after the clinic has received the results. If you do not hear from us within 7 days, please contact the clinic through [employer] or phone. If you have a critical or abnormal lab result, we will notify you by phone as soon as possible.  Submit refill requests through [employer] or call your pharmacy and they will forward the refill request to us. Please allow 3 business days for your refill to be completed.          Additional Information About Your Visit        [employer] Information     [employer] gives you secure access to your electronic health record. If you see a primary care provider, you can also send messages to your  "care team and make appointments. If you have questions, please call your primary care clinic.  If you do not have a primary care provider, please call [phone] and they will assist you.        Care EveryWhere ID     This is your Care EveryWhere ID. This could be used by other organizations to access your [facility] medical records  [id number]        Your Vitals Were     Pulse Temperature Height Pulse Oximetry BMI (Body Mass Index)       71 97.5  F (36.4  C) 5' 9\" (1.753 m) 99% 33.97 kg/m2        Blood Pressure from Last 3 Encounters:   05/10/17 142/90   05/04/17 142/86   04/28/17 (!) 142/92    Weight from Last 3 Encounters:   05/10/17 230 lb (104.3 kg)   05/04/17 230 lb (104.3 kg)   04/28/17 236 lb (107 kg)              We Performed the Following     PAIN MANAGEMENT CENTER ([facility]) REFERRAL        Primary Care Provider Office Phone # Fax #    Sheeren Garcia [phone]       [facility] [address] [address] [facility] [address]        Thank you!     Thank you for choosing [facility]  for your care. Our goal is always to provide you with excellent care. Hearing back from our patients is one way we can continue to improve our services. Please take a few minutes to complete the written survey that you may receive in the mail after your visit with us. Thank you!             Your Updated Medication List - Protect others around you: Learn how to safely use, store and throw away your medicines at www.disposemymeds.org.          This list is accurate as of: 5/10/17  2:11 PM.  Always use your most recent med list.                   Brand Name Dispense Instructions for use    EXCEDRIN PO          MOTRIN  MG tablet   Generic drug:  ibuprofen      Take 200 mg by mouth every 4 hours as needed for mild pain Reported on 4/28/2017       TYLENOL PO            "

## 2017-05-10 NOTE — PROGRESS NOTES
Neurosurgery Spine Consult [facility]      CC: Low back and RLE > LLE pain    Primary care Provider: Shereen Garcia    Referring provider: Dr. Raquel PEREZ: Taylor Dunlap is a 46 year old female that presents to clinic with a complaint of low back and RLE > LLE radicular pain. The patient has had a lumbar microdiscectomy in 1999 in the [facility], but, can not remember the surgeon. She says her back pain and RLE pain is progressing and affecting her AODL. She has tried PT, but, not KARL. She occasionally stumbles when she walks. Her radicular pain is in the right > left L5 distribution and denies left L3 pain.      Past Medical History:   Diagnosis Date     NO ACTIVE PROBLEMS      Skin cancer, basal cell 2013       Past Surgical History:   Procedure Laterality Date     C NONSPECIFIC PROCEDURE  4/99    L4-5 laminectomy & microdiscectomy ([facility])       Current Outpatient Prescriptions   Medication     Acetaminophen (TYLENOL PO)     Aspirin-Acetaminophen-Caffeine (EXCEDRIN PO)     ibuprofen (MOTRIN IB) 200 MG tablet     No current facility-administered medications for this visit.        Allergies   Allergen Reactions     No Known Drug Allergies        Social History     Social History     Marital status:      Spouse name: N/A     Number of children: N/A     Years of education: N/A     Occupational History      Self     Social History Main Topics     Smoking status: Current Every Day Smoker     Packs/day: 0.50     Years: 10.00     Types: Cigarettes     Last attempt to quit: 4/1/2009     Smokeless tobacco: Never Used     Alcohol use Yes      Comment: occasional; couple glasses wine on weekends.     Drug use: No     Sexual activity: Yes     Partners: Male     Birth control/ protection: Surgical      Comment: vasectomy     Other Topics Concern     Special Diet No     trying to eating healthy;  dairy.     Exercise Yes     walking as able. exercises with back  pain     Social History Narrative       Family History   Problem Relation Age of Onset     Arthritis Brother      CEREBROVASCULAR DISEASE Maternal Grandfather      NARINDER. Paternal Grandfather      MI; 50's     Arthritis Paternal Grandmother      RA     Brain Cancer Paternal Aunt      R.A     Arthritis Paternal Aunt          Review Of Systems  Skin: negative  Eyes: negative  Ears/Nose/Throat: negative  Respiratory: No shortness of breath, dyspnea on exertion, cough, or hemoptysis  Cardiovascular: negative  Gastrointestinal: negative  Genitourinary: negative  Musculoskeletal: as above and back pain  Neurologic: as above  Psychiatric: negative  Hematologic/Lymphatic/Immunologic: negative  Endocrine: negative    B/P: 142/90, T: 97.5, P: 71, R: Data Unavailable    Examination:  Awake  Alert  Oriented x 3  Speech clear  Cranial nerves II - XII intact  Face symmetric  Normal ROM of back  Motor exam    RLE - iliopsoas 5/5, quads 5/5, hamstrings 5/5, dorsiflexion 5/5, plantar flexion 5/5, eversion 5/5, inversion 5/5, EHL 5/5   LLE -  iliopsoas 5/5, quads 5/5, hamstrings 5/5, dorsiflexion 5/5, plantar flexion 5/5, eversion 5/5, inversion 5/5, EHL 5/5  Sensation decreased in right L5 distribution  Clonus negative  Negative Lase'marshal's sign bilaterally   Ambulation stable    Imaging:   MRI lumbar - left L3-4 far lateral extraforaminal disk bulge and post op changes at L4-5 with severe DDD and stenosis and foraminal stenosis      Assessment/Plan:   1. I have recommended a L4-5 KARL  2. If she is no better, I have recommended a redo right L4-5 TLIF. I discussed with the patient the risk of surgery to include, but, not be limited to; nerve injury, pseudoarthrosis, failure of hardware, failure of improvement of symptoms, CSF leak,  infection, post op hematoma, the need for recurrent surgery, paralysis, coma and death.  3. She will call if she wants to proceed         Howard Healy MD, MS, FAANS  Neurosurgeon  [facility]  [facility]  [address], [address]  [place] [address] [phone]

## 2017-05-11 ENCOUNTER — RADIANT APPOINTMENT (OUTPATIENT)
Dept: GENERAL RADIOLOGY | Facility: CLINIC | Age: 47
End: 2017-05-11
Attending: ANESTHESIOLOGY
Payer: COMMERCIAL

## 2017-05-11 ENCOUNTER — RADIOLOGY INJECTION OFFICE VISIT (OUTPATIENT)
Dept: PALLIATIVE MEDICINE | Facility: CLINIC | Age: 47
End: 2017-05-11
Payer: COMMERCIAL

## 2017-05-11 VITALS — DIASTOLIC BLOOD PRESSURE: 83 MMHG | SYSTOLIC BLOOD PRESSURE: 143 MMHG | HEART RATE: 70 BPM | OXYGEN SATURATION: 96 %

## 2017-05-11 DIAGNOSIS — M54.16 LUMBAR RADICULOPATHY: ICD-10-CM

## 2017-05-11 DIAGNOSIS — M54.16 LUMBAR RADICULOPATHY: Primary | ICD-10-CM

## 2017-05-11 PROCEDURE — 62323 NJX INTERLAMINAR LMBR/SAC: CPT | Performed by: ANESTHESIOLOGY

## 2017-05-11 ASSESSMENT — PAIN SCALES - GENERAL: PAINLEVEL: MILD PAIN (2)

## 2017-05-11 NOTE — PROGRESS NOTES
[facility] - Procedure Note    Date of Visit: 5/11/2017    Procedure performed: Lumbar L4-5 interlaminar epidural steroid injection  Diagnosis: Lumbar spondylosis; Lumbar radiculitis/radiculopathy  : Amber Ariza MD   Anesthesia: none    Indications: Taylor Dunlap is a 46 year old female who is seen at the request of Dr. Healy for a lumbar epidural steroid injection. The patient describes chronic low back pain radiating into both legs. She is s/p discectomy in 1989. The patient has been exhibiting symptoms consistent with lumbar intraspinal inflammation and radiculopathy. Symptoms have been persistent, disabling, and intermittently severe. The patient reports minimal improvement with conservative treatment, including PT and medications.    Lumbar MRI was done on 5/10/2017 which showed   FINDINGS: Five lumbar type vertebral bodies are presumed. There is  some minimal retrolisthesis of L4 and L5 of approximately 2 mm.  Posterior alignment is otherwise normal. The conus medullaris and  cauda equina nerve roots are normal. Disc space narrowing is present  at L2-L3 and L4-L5. Bone marrow signal intensity is within normal  limits except for some mild discogenic marrow changes at L2-L3 and  L4-L5.     L1-L2: Normal.     L2-L3: Broad-based disc bulging and facet hypertrophy is present.  There is mild bilateral neural foraminal stenosis but no significant  central canal stenosis.     L3-L4: There is a small-to-moderate size left posterolateral disc  protrusion superimposed upon some minimal disc bulging. This is  resulting in moderate left-sided foraminal stenosis but no central  canal stenosis.     L4-L5: There is a broad-based disc protrusion and facet hypertrophy.  There is also some minimal retrolisthesis at this level. Findings are  resulting in some mild central canal stenosis and mild-to-moderate  bilateral neural foraminal stenosis.     L5-SI: Moderate facet hypertrophy is present.  There is no stenosis.     Paraspinal soft tissues: Unremarkable as visualized.       IMPRESSION:  1. Small-to-moderate size left posterolateral L3-L4 disc protrusion  with secondary moderate left-sided foraminal stenosis.  2. L4-L5 degenerative disc and facet disease with secondary mild  central canal stenosis and mild-to-moderate bilateral neural foraminal  stenosis.  3. Mild bilateral neural foraminal stenosis at L2-L3 secondary to  degenerative disc and facet disease is also present.     AUDREY DEJESUS MD    Allergies:      Allergies   Allergen Reactions     No Known Drug Allergies         Vitals:  /81  Pulse 74  SpO2 99%  Breastfeeding? No    Review of Systems: The patient denies recent fever, chills, illness, use of antibiotics or anticoagulants. All other 10-point review of systems negative.     Procedure: The procedure and risks were explained, and informed written consent was obtained from the patient. Risks include but are not limited to: infection, bleeding, increased pain, and damage to soft tissue, nerve, muscle, and vasculature structures. After getting informed consent, patient was brought into the procedure suite and was placed in a prone position on the procedure table. A Pause for the Cause was performed. Patient was prepped and draped in sterile fashion.     The L4-5 interspace was identified with use of fluoroscopy in AP view. A 25-gauge, 1.5 inch needle was used to anesthetize the skin and subcutaneous tissue entry site with a total of 2 ml of 1% lidocaine. Under fluoroscopic visualization, a 22-gauge, 4.5 inch Tuohy epidural needle was slowly advanced towards the epidural space a few millimeters left of midline. The latter part of the needle advancement was guided with fluoroscopy in the lateral view. The epidural space was identified using loss of resistance technique. After negative aspiration for heme and cerebrospinal fluid, a total of 1 mL of non-ionic contrast was injected to  confirm needle placement with 9 mL of contrast wasted. Epidurogram confirmed spread within the posterior epidural space. 2 ml of 40mg/ml of triamcinolone, 2 ml of 1% lidocaine, and 1 ml of preservative free saline was injected. The needle was removed.  Images were saved to PACS.    The patient tolerated the procedure well, and there was no evidence of procedural complications. No new sensory or motor deficits were noted following the procedure. The patient was stable and able to ambulate on discharge home. Post-procedure instructions were provided.     Pre-procedure pain score: 3/10 in the back, 2/10 in the leg  Post-procedure pain score: 0/10 in the back, 0/10 in the leg    Assessment/Plan: Taylor Dunlap is a 46 year old female s/p lumbar interlaminar epidural steroid injection today for lumbar spondylosis and radiculitis/radiculopathy.     1. Following today's procedure, the patient was advised to contact the [facility] for any of the following:   Fever, chills, or night sweats   New onset of pain, numbness, or weakness   Any questions/concerns regarding the procedure  If unable to contact the Pain Center, the patient was instructed to go to a local Emergency Room for any complications.   2. The patient will receive a follow-up call in 1 week.   3. Follow-up with Dr. Healy for post-procedure evaluation.    Amber ArizaMD Pain Management

## 2017-05-11 NOTE — NURSING NOTE
Injection intake:    If this procedure is requiring IV sedation has patient been NPO for 6  Hours? NA    Is patient on coumadin, plavix or other prescribed blood thinner?   No    If patient is on coumadin was it held for 5 days?   NA    If patient is on plavix was it held for 7 days?    NA     Does patient take aspirin?  Yes -   ASA    If this is for a cervical procedure and patient is on aspirin has it been held for 6 days?   NA    Any allergies to contrast dye, iodine, steroid and/or numbing medications?  NO    Is patient currently taking antibiotics or have an active infection?  NO    Does patient have a ? Yes       Is patient pregnant or breastfeeding?  NO    Are the vital signs normal?  Yes

## 2017-05-11 NOTE — PATIENT INSTRUCTIONS
[facility] Procedure Discharge Instructions    Today you saw:  Dr. Amber Ariza    Your procedure:  Lumbar epidural steroid injection       Medications used:  Lidocaine (anesthetic)    Kenalog (steroid)      Omnipaque (contrast)                 Be cautious when walking as numbness and/or weakness in the legs may occur up to 6-8 hours after the procedure due to effect of the local anesthetic    Do not drive for 6 hours. The effect of the local anesthetic could slow your reflexes.     Avoid strenuous activity for the first 24 hours. You may resume your regular activities after that.     You may shower, however avoid swimming, tub baths or hot tubs for 24 hours following your procedure    You may have a mild to moderate increase in pain for several days following the injection.      You may use ice packs for 10-15 minutes, 3 to 4 times a day at the injection site for comfort    Do not use heat to painful areas for 6 to 8 hours. This will give the local anesthetic time to wear off and prevent you from accidentally burning your skin.    You may use anti-inflammatory medications (such as Ibuprofen/Advil or Aleve) or Tylenol for pain control if necessary    It may take up to 14 days for the steroid medication to start working although you may feel the effect as early as a few days after the procedure.       If you experience any of the following, call the pain center line during work hours at [phone] or on-call physician after hours at [phone]:  -Fever over 100 degree F  -Swelling, bleeding, redness, drainage, warmth at the injection site  -Progressive weakness or numbness in your legs   -Loss of bowel or bladder function  -Unusual headache that is not relieved by Tylenol or your regular headache medication  -Unusual new onset of pain that is not improving    Phone #s:  Nurse triage line for general questions: [phone]

## 2017-05-11 NOTE — MR AVS SNAPSHOT
After Visit Summary   5/11/2017    Taylor Dunlap    MRN: [id number]           Patient Information     Date Of Birth          1970        Visit Information        Provider Department      5/11/2017 10:45 AM Amber Ariza MD [facility]        Care Instructions    [facility] Procedure Discharge Instructions    Today you saw:  Dr. Amber Ariza    Your procedure:  Lumbar epidural steroid injection       Medications used:  Lidocaine (anesthetic)    Kenalog (steroid)      Omnipaque (contrast)                 Be cautious when walking as numbness and/or weakness in the legs may occur up to 6-8 hours after the procedure due to effect of the local anesthetic    Do not drive for 6 hours. The effect of the local anesthetic could slow your reflexes.     Avoid strenuous activity for the first 24 hours. You may resume your regular activities after that.     You may shower, however avoid swimming, tub baths or hot tubs for 24 hours following your procedure    You may have a mild to moderate increase in pain for several days following the injection.      You may use ice packs for 10-15 minutes, 3 to 4 times a day at the injection site for comfort    Do not use heat to painful areas for 6 to 8 hours. This will give the local anesthetic time to wear off and prevent you from accidentally burning your skin.    You may use anti-inflammatory medications (such as Ibuprofen/Advil or Aleve) or Tylenol for pain control if necessary    It may take up to 14 days for the steroid medication to start working although you may feel the effect as early as a few days after the procedure.       If you experience any of the following, call the pain center line during work hours at [phone] or on-call physician after hours at [phone]:  -Fever over 100 degree F  -Swelling, bleeding, redness, drainage, warmth at the injection site  -Progressive weakness or numbness in your legs   -Loss of bowel or  bladder function  -Unusual headache that is not relieved by Tylenol or your regular headache medication  -Unusual new onset of pain that is not improving    Phone #s:  Nurse triage line for general questions: [phone]            Follow-ups after your visit        Who to contact     If you have questions or need follow up information about today's clinic visit or your schedule please contact [facility] directly at [phone].  Normal or non-critical lab and imaging results will be communicated to you by [employer], letter or phone within 4 business days after the clinic has received the results. If you do not hear from us within 7 days, please contact the clinic through [employer] or phone. If you have a critical or abnormal lab result, we will notify you by phone as soon as possible.  Submit refill requests through [employer] or call your pharmacy and they will forward the refill request to us. Please allow 3 business days for your refill to be completed.          Additional Information About Your Visit        [employer] Information     [employer] gives you secure access to your electronic health record. If you see a primary care provider, you can also send messages to your care team and make appointments. If you have questions, please call your primary care clinic.  If you do not have a primary care provider, please call [phone] and they will assist you.        Care EveryWhere ID     This is your Care EveryWhere ID. This could be used by other organizations to access your [facility] medical records  [id number]        Your Vitals Were     Pulse Pulse Oximetry Breastfeeding?             74 99% No          Blood Pressure from Last 3 Encounters:   05/11/17 138/81   05/10/17 142/90   05/04/17 142/86    Weight from Last 3 Encounters:   05/10/17 104.3 kg (230 lb)   05/04/17 104.3 kg (230 lb)   04/28/17 107 kg (236 lb)              Today, you had the following     No orders found for display       Primary Care  Provider Office Phone # Fax #    Shereen Garcia [phone]       [facility] [address] [facility] [address]        Thank you!     Thank you for choosing [facility]  for your care. Our goal is always to provide you with excellent care. Hearing back from our patients is one way we can continue to improve our services. Please take a few minutes to complete the written survey that you may receive in the mail after your visit with us. Thank you!             Your Updated Medication List - Protect others around you: Learn how to safely use, store and throw away your medicines at www.disposemymeds.org.          This list is accurate as of: 5/11/17 11:21 AM.  Always use your most recent med list.                   Brand Name Dispense Instructions for use    EXCEDRIN PO          MOTRIN  MG tablet   Generic drug:  ibuprofen      Take 200 mg by mouth every 4 hours as needed for mild pain Reported on 4/28/2017       TYLENOL PO

## 2017-05-11 NOTE — NURSING NOTE
Discharge Information    IV Discontiued Time:  NA    Amount of Fluid Infused:  NA    Discharge Criteria = When patient returns to baseline or as per MD order    Consciousness:  Pt is fully awake    Circulation:  BP +/- 20% of pre-procedure level    Respiration:  Patient is able to breathe deeply    O2 Sat:  Patient is able to maintain O2 Sat >92% on room air    Activity:  Moves 4 extremities on command    Ambulation:  Patient is able to stand and walk     Dressing:  Clean/dry or No Dressing    Notes:   Discharge instructions and AVS given to patient    Patient meets criteria for discharge?  YES    Admitted to PCU?  No    Responsible adult present to accompany patient home?  Yes    Signature/Title:    Micki Dubois RN Care Coordinator  [facility] [place]

## 2017-06-17 ENCOUNTER — HEALTH MAINTENANCE LETTER (OUTPATIENT)
Age: 47
End: 2017-06-17

## 2017-11-02 ENCOUNTER — TELEPHONE (OUTPATIENT)
Dept: FAMILY MEDICINE | Facility: CLINIC | Age: 47
End: 2017-11-02

## 2018-02-11 ENCOUNTER — HOSPITAL ENCOUNTER (EMERGENCY)
Facility: CLINIC | Age: 48
Discharge: HOME | End: 2018-02-11
Attending: EMERGENCY MEDICINE | Admitting: EMERGENCY MEDICINE
Payer: COMMERCIAL

## 2018-02-11 ENCOUNTER — APPOINTMENT (OUTPATIENT)
Dept: GENERAL RADIOLOGY | Facility: CLINIC | Age: 48
End: 2018-02-11
Attending: EMERGENCY MEDICINE
Payer: COMMERCIAL

## 2018-02-11 VITALS
SYSTOLIC BLOOD PRESSURE: 141 MMHG | HEART RATE: 105 BPM | OXYGEN SATURATION: 98 % | RESPIRATION RATE: 28 BRPM | TEMPERATURE: 98.1 F | DIASTOLIC BLOOD PRESSURE: 96 MMHG

## 2018-02-11 DIAGNOSIS — R00.2 PALPITATIONS: ICD-10-CM

## 2018-02-11 LAB
ALBUMIN SERPL-MCNC: 4.3 G/DL (ref 3.4–5)
ALBUMIN UR-MCNC: NEGATIVE MG/DL
ALP SERPL-CCNC: 65 U/L (ref 40–150)
ALT SERPL W P-5'-P-CCNC: 25 U/L (ref 0–50)
ANION GAP SERPL CALCULATED.3IONS-SCNC: 9 MMOL/L (ref 3–14)
APPEARANCE UR: CLEAR
AST SERPL W P-5'-P-CCNC: 27 U/L (ref 0–45)
BASOPHILS # BLD AUTO: 0.1 10E9/L (ref 0–0.2)
BASOPHILS NFR BLD AUTO: 0.6 %
BILIRUB DIRECT SERPL-MCNC: <0.1 MG/DL (ref 0–0.2)
BILIRUB SERPL-MCNC: 0.4 MG/DL (ref 0.2–1.3)
BILIRUB UR QL STRIP: NEGATIVE
BUN SERPL-MCNC: 16 MG/DL (ref 7–30)
CALCIUM SERPL-MCNC: 8.7 MG/DL (ref 8.5–10.1)
CHLORIDE SERPL-SCNC: 107 MMOL/L (ref 94–109)
CO2 SERPL-SCNC: 22 MMOL/L (ref 20–32)
COLOR UR AUTO: YELLOW
CREAT SERPL-MCNC: 0.73 MG/DL (ref 0.52–1.04)
D DIMER PPP FEU-MCNC: 0.5 UG/ML FEU (ref 0–0.5)
DIFFERENTIAL METHOD BLD: NORMAL
EOSINOPHIL # BLD AUTO: 0.3 10E9/L (ref 0–0.7)
EOSINOPHIL NFR BLD AUTO: 2.7 %
ERYTHROCYTE [DISTWIDTH] IN BLOOD BY AUTOMATED COUNT: 12.7 % (ref 10–15)
GFR SERPL CREATININE-BSD FRML MDRD: 85 ML/MIN/1.7M2
GLUCOSE SERPL-MCNC: 161 MG/DL (ref 70–99)
GLUCOSE UR STRIP-MCNC: NEGATIVE MG/DL
HCG UR QL: NEGATIVE
HCT VFR BLD AUTO: 42.7 % (ref 35–47)
HGB BLD-MCNC: 13.9 G/DL (ref 11.7–15.7)
HGB UR QL STRIP: NEGATIVE
IMM GRANULOCYTES # BLD: 0.1 10E9/L (ref 0–0.4)
IMM GRANULOCYTES NFR BLD: 0.5 %
KETONES UR STRIP-MCNC: NEGATIVE MG/DL
LEUKOCYTE ESTERASE UR QL STRIP: NEGATIVE
LYMPHOCYTES # BLD AUTO: 3.4 10E9/L (ref 0.8–5.3)
LYMPHOCYTES NFR BLD AUTO: 31.6 %
MCH RBC QN AUTO: 31.2 PG (ref 26.5–33)
MCHC RBC AUTO-ENTMCNC: 32.6 G/DL (ref 31.5–36.5)
MCV RBC AUTO: 96 FL (ref 78–100)
MONOCYTES # BLD AUTO: 1 10E9/L (ref 0–1.3)
MONOCYTES NFR BLD AUTO: 8.8 %
MUCOUS THREADS #/AREA URNS LPF: PRESENT /LPF
NEUTROPHILS # BLD AUTO: 6.1 10E9/L (ref 1.6–8.3)
NEUTROPHILS NFR BLD AUTO: 55.8 %
NITRATE UR QL: NEGATIVE
NRBC # BLD AUTO: 0 10*3/UL
NRBC BLD AUTO-RTO: 0 /100
PH UR STRIP: 6 PH (ref 5–7)
PLATELET # BLD AUTO: 379 10E9/L (ref 150–450)
POTASSIUM SERPL-SCNC: 4.3 MMOL/L (ref 3.4–5.3)
PROT SERPL-MCNC: 7.8 G/DL (ref 6.8–8.8)
RBC # BLD AUTO: 4.45 10E12/L (ref 3.8–5.2)
RBC #/AREA URNS AUTO: 1 /HPF (ref 0–2)
SODIUM SERPL-SCNC: 138 MMOL/L (ref 133–144)
SOURCE: ABNORMAL
SP GR UR STRIP: 1.02 (ref 1–1.03)
SQUAMOUS #/AREA URNS AUTO: <1 /HPF (ref 0–1)
TROPONIN I SERPL-MCNC: <0.015 UG/L (ref 0–0.04)
TSH SERPL DL<=0.005 MIU/L-ACNC: 1.25 MU/L (ref 0.4–4)
UROBILINOGEN UR STRIP-MCNC: 0 MG/DL (ref 0–2)
WBC # BLD AUTO: 10.8 10E9/L (ref 4–11)
WBC #/AREA URNS AUTO: <1 /HPF (ref 0–2)

## 2018-02-11 PROCEDURE — 85025 COMPLETE CBC W/AUTO DIFF WBC: CPT | Performed by: EMERGENCY MEDICINE

## 2018-02-11 PROCEDURE — 80048 BASIC METABOLIC PNL TOTAL CA: CPT | Performed by: EMERGENCY MEDICINE

## 2018-02-11 PROCEDURE — 81025 URINE PREGNANCY TEST: CPT | Performed by: EMERGENCY MEDICINE

## 2018-02-11 PROCEDURE — 84443 ASSAY THYROID STIM HORMONE: CPT | Performed by: EMERGENCY MEDICINE

## 2018-02-11 PROCEDURE — 93005 ELECTROCARDIOGRAM TRACING: CPT

## 2018-02-11 PROCEDURE — 71046 X-RAY EXAM CHEST 2 VIEWS: CPT

## 2018-02-11 PROCEDURE — 99285 EMERGENCY DEPT VISIT HI MDM: CPT | Mod: 25

## 2018-02-11 PROCEDURE — 80076 HEPATIC FUNCTION PANEL: CPT | Performed by: EMERGENCY MEDICINE

## 2018-02-11 PROCEDURE — 93005 ELECTROCARDIOGRAM TRACING: CPT | Mod: 76

## 2018-02-11 PROCEDURE — 84484 ASSAY OF TROPONIN QUANT: CPT | Performed by: EMERGENCY MEDICINE

## 2018-02-11 PROCEDURE — 85379 FIBRIN DEGRADATION QUANT: CPT | Performed by: EMERGENCY MEDICINE

## 2018-02-11 PROCEDURE — 81001 URINALYSIS AUTO W/SCOPE: CPT | Performed by: EMERGENCY MEDICINE

## 2018-02-11 RX ORDER — NITROGLYCERIN 0.4 MG/1
0.4 TABLET SUBLINGUAL EVERY 5 MIN PRN
Status: DISCONTINUED | OUTPATIENT
Start: 2018-02-11 | End: 2018-02-11

## 2018-02-11 ASSESSMENT — ENCOUNTER SYMPTOMS
VOMITING: 0
ABDOMINAL PAIN: 0
BACK PAIN: 0
SHORTNESS OF BREATH: 0
WHEEZING: 0
DIAPHORESIS: 0
DIARRHEA: 0
PALPITATIONS: 1
NAUSEA: 0
COUGH: 0

## 2018-02-11 NOTE — ED AVS SNAPSHOT
[facility]    [address]    [facility] [address]    Phone:  [phone]    Fax:  [phone]                                       Taylor Dunlap   MRN: [id number]    Department:  [facility]   Date of Visit:  2/11/2018           After Visit Summary Signature Page     I have received my discharge instructions, and my questions have been answered. I have discussed any challenges I see with this plan with the nurse or doctor.    ..........................................................................................................................................  Patient/Patient Representative Signature      ..........................................................................................................................................  Patient Representative Print Name and Relationship to Patient    ..................................................               ................................................  Date                                            Time    ..........................................................................................................................................  Reviewed by Signature/Title    ...................................................              ..............................................  Date                                                            Time

## 2018-02-11 NOTE — ED NOTES
Patient presents for complaint of a racing heart sensation with shortness of breath x1 hour. Patient states she was making her bed at the time of onset and has been constant since. Denies previously similar episodes. Denies pain, but states her chest feels heavy. ABC intact, A&Ox4.

## 2018-02-11 NOTE — ED PROVIDER NOTES
History     Chief Complaint:  Palpitations    HPI   Taylor Dunlap is a 47 year old female with a history of hyperlipidemia who presents with family to the ED for evaluation of palpitations. The patient reports that she had a sudden onset of palpitations at 1230 this afternoon while making her bed and cleaning the house. She tried to sit down but symptoms did not resolve. She called the nurse line who suggested she be seen in the ED. On her way into the ED, she reports experiencing tingling in her fingertips. Here in the ED, her palpitations have resolved and she is asymptomatic. She usually drinks caffeine daily but reports only have /2 cup of tea today. She has no history of palpitations. She denies any chest pain, leg swelling, cough, or any other symptoms.    Allergies:  No known drug allergies     Medications:    Tylenol  Excedrin  Motrin IB     Past Medical History:    Skin cancer, basal cell  Sciatica  Obesity  Hyperlipidemia  Knee pain    Past Surgical History:    L4-5 laminectomy & microdiscectomy     Family History:    Arthritis     Social History:  Smoking status: Current every day smoker  Alcohol use: Yes  Marital Status:       Review of Systems   Constitutional: Negative for diaphoresis.   Respiratory: Negative for cough, shortness of breath and wheezing.    Cardiovascular: Positive for palpitations (resolved). Negative for chest pain and leg swelling.   Gastrointestinal: Negative for abdominal pain, diarrhea, nausea and vomiting.   Musculoskeletal: Negative for back pain.   All other systems reviewed and are negative.    Patient presents for complaint of a racing heart sensation with shortness of breath x1 hour. Patient states she was making her bed at the time of onset.. Denies previously similar episodes. Denies pain, but stated that her chest feels heavy (now resolved).  No syncope.    Physical Exam   Patient Vitals for the past 24 hrs:   BP Temp Temp src Pulse Heart Rate Resp SpO2    02/11/18 1615 - - - - - - 98 %   02/11/18 1600 (!) 141/96 - - - 71 - 99 %   02/11/18 1530 126/85 - - - 81 - 99 %   02/11/18 1515 125/71 - - - 77 - 98 %   02/11/18 1500 118/78 - - - 85 - 95 %   02/11/18 1344 (!) 148/97 98.1  F (36.7  C) Oral - - - -   02/11/18 1341 - - - 105 - 28 100 %     Physical Exam  GEN: patient smiling, no distress  HEAD: atraumatic, normocephalic  EYES: pupils reactive (3plus to 2plus), extraocular muscles intact, conjunctivae normal  ENT: TMs flat and white bilaterally, oropharynx normal with no erythema or exudate, mucus membranes moist  NECK: no cervical LAD, no JVD, no thyromegaly  RESPIRATORY: no tachypnea, breath sounds clear to auscultation (no rales, wheezes, rhonchi), chest wall nontender, normal phonation  CVS: normal S1/S2, no murmurs/rubs/gallops  ABDOMEN: soft, nontender, no masses or organomegaly, no rebound, positive bowel sounds  BACK: no costovertebral angle tenderness  EXTREMITIES: intact pulses x 4, full range of motion at joints, no edema  MUSCULOSKELETAL: no deformities  SKIN: warm and dry  NEURO: GCS 15, cranial nerves intact.  Motor- moves all 4 extremities with 5/5 strength,  5/5  Sensation- intact all dermatones to pinprick and light touch.  Reflexes- DTRs 2plus.  Coordination- romberg negative, normal tandem gait, no ataxia.  Overall symmetrical exam  HEME: no bruising or petechiae/contusions  LYMPH: no lymphadenopathy    Emergency Department Course   ECG #1 (13:44:02):  Rate 100 bpm. KS interval 128. QRS duration 88. QT/QTc 328/423. P-R-T axes 77 68 50. Normal sinus rhythm. Normal ECG. Interpreted by Joya Urbina MD.  Initial ECG was ?ST depression in AVF and V3-V4  Normal axis.  No STEMI    ECG #2 (14:56:02):  Rate 82 bpm. KS interval not able to be determined. QRS duration 96. QTc 460. P-R-T axes  59 31  Normal axis. Normal sinus rhythm. Interpreted by Joya Urbina MD.   No STEMI  Inverted T wave in III and ST slurring without depression or  elevation in AVF    ECG #3 (16:40:43):  Rate 73 bpm. OH interval 138. QRS duration 96. QT/QTc 416/458. P-R-T axes 57 57 30. Normal sinus rhythm. Normal ECG. Interpreted by Joya Urbina MD.  Normal axis.  No acute ST or T wave changes.    Imaging:  Radiographic findings were communicated with the patient and family who voiced understanding of the findings.    X-ray Chest, 2 views:  IMPRESSION: Normal.  Result per radiology.     Laboratory:  CBC: WNL (WBC 10.8, HGB 13.9, )   BMP: Glucose 161 (H) o/w WNL (Creatinine 0.73)  1346 - Troponin: <0.015  Hepatic panel: WNL  D-dimer: 0.5  TSH: 1.25  UA: Mucous present, o/w Negative  HCG: Negative    Interventions:  Heplock  Cardiac/Sp02 monitoring  1L NS IV    Emergency Department Course:  Past medical records, nursing notes, and vitals reviewed.  1429: I performed an exam of the patient and obtained history, as documented above. GCS 15.    IV inserted and blood drawn. The patient was placed on continuous cardiac monitoring and pulse oximetry.    The patient was sent for a x-ray while in the emergency department, findings above.    1717: I rechecked the patient. Findings and plan explained to the Patient. Patient discharged home with instructions regarding supportive care, medications, and reasons to return. The importance of close follow-up was reviewed.     Patient updated    HR < 90 at the time of discharge    Discussed results with patient.  Gave patient copies of results (applicable labs, CT scans and/or ultrasound).  Answered questions.  Asked patient to followup with PCP.    Impression & Plan      Medical Decision Making:  Taylor Dunlap is a 47 year old female who feels as though she has palpitations without any syncope.  Atypical chest discomfort started while the patient was making her bed- and has now resolved.  The patient has never underwent these symptoms previously. When she first arrived, she had an EKG that showed she was tachycardic but there  was no evidence of ST elevation or other abnormalities. She perhaps did have about a millimeter of ST depression, but it could have been not related and it wasn't definite ST depression. The second EKG did not show any of that and the third EKG did not show any abnormalities with regard to that. IV was placed and labs were sent. The patient's TSH was normal, showing no evidence of hyperthyroidism. Patient was placed on the cardiac monitor. She was taken for a chest x-ray which was normal. She is not anemic and her electrolytes are grossly normal. D-dimer does not show any elevations consistent with pulmonary embolism. Her urinalysis and pregnancy test are otherwise negative. The patient was observed in the ED for about 4 hours and her EKG has normalized at this point. She does perhaps have a little UT shortening on the other EKG's that were performed but there is definitely no ST depression, elevation, or STEMI criteria. The patient is able to be discharged home and I did order an outpatient Holter monitor for her. She is given copies of her stay and should follow up.    Diagnosis:    ICD-10-CM    1. Palpitations R00.2 Holter Monitor 48 hour - Adult       Disposition:  discharged to home    Instructions to patient:  Reasons to return: chest pain, shortness of breath, nausea/vomiting, bleeding, confusion, blood in stools, dizziness, passing out, increasing headache, weakness, inability to walk.  Also return if cough, difficulty breathing, nausea/vomiting, confusion, or any other problems.    Please followup with your doctor in 5-7 days.  Come back if not better.        Rosana Victor  2/11/2018   [facility] EMERGENCY DEPARTMENT  I, Rosana Victor, am serving as a scribe at 2:29 PM on 2/11/2018 to document services personally performed by Joya Urbina MD based on my observations and the provider's statements to me.        Joya Urbina MD  02/11/18 2654

## 2018-02-11 NOTE — ED AVS SNAPSHOT
[facility] Department    [address]    [facility] [address]    Phone:  [phone]    Fax:  [phone]                                       Taylor Dunlap   MRN: [id number]    Department:  [facility] Emergency Department   Date of Visit:  2/11/2018           Patient Information     Date Of Birth          1970        Your diagnoses for this visit were:     Palpitations        You were seen by Darcy Sethi MD and Joya Urbina MD.      Follow-up Information     Follow up with Shereen Garcia MD.    Specialty:  Family Practice    Contact information:    [address] [address] [facility] [address] [phone]        Discharge References/Attachments     PALPITATIONS (ENGLISH)    HOLTER MONITORING, WHAT IS (ENGLISH)    EVENT MONITORING, WHAT IS (ENGLISH)      24 Hour Appointment Hotline       To make an appointment at any [facility] clinic, call [phone] ([phone]). If you don't have a family doctor or clinic, we will help you find one. [facility] clinics are conveniently located to serve the needs of you and your family.          ED Discharge Orders     Holter Monitor 48 hour - Adult                    Review of your medicines      Our records show that you are taking the medicines listed below. If these are incorrect, please call your family doctor or clinic.        Dose / Directions Last dose taken    EXCEDRIN PO        Refills:  0        MOTRIN  MG tablet   Dose:  200 mg   Generic drug:  ibuprofen        Take 200 mg by mouth every 4 hours as needed for mild pain Reported on 4/28/2017   Refills:  0        TYLENOL PO        Refills:  0                Procedures and tests performed during your visit     Procedure/Test Number of Times Performed    Basic metabolic panel 1    CBC with platelets differential 1    Chest XR,  PA & LAT 1    D dimer quantitative 1    EKG 12 lead 3    HCG qualitative urine 1    Hepatic panel 1    Peripheral IV  catheter 1    TSH with free T4 reflex 1    Troponin I 1    UA with Microscopic 1      Orders Needing Specimen Collection     None      Pending Results     Date and Time Order Name Status Description    2/11/2018 1434 EKG 12 lead Preliminary     2/11/2018 1341 EKG 12 lead Preliminary             Pending Culture Results     No orders found from 2/9/2018 to 2/12/2018.            Pending Results Instructions     If you had any lab results that were not finalized at the time of your Discharge, you can call the ED Lab Result RN at [phone]. You will be contacted by this team for any positive Lab results or changes in treatment. The nurses are available 7 days a week from 10A to 6:30P.  You can leave a message 24 hours per day and they will return your call.        Test Results From Your Hospital Stay        2/11/2018  2:05 PM      Component Results     Component Value Ref Range & Units Status    WBC 10.8 4.0 - 11.0 10e9/L Final    RBC Count 4.45 3.8 - 5.2 10e12/L Final    Hemoglobin 13.9 11.7 - 15.7 g/dL Final    Hematocrit 42.7 35.0 - 47.0 % Final    MCV 96 78 - 100 fl Final    MCH 31.2 26.5 - 33.0 pg Final    MCHC 32.6 31.5 - 36.5 g/dL Final    RDW 12.7 10.0 - 15.0 % Final    Platelet Count 379 150 - 450 10e9/L Final    Diff Method Automated Method  Final    % Neutrophils 55.8 % Final    % Lymphocytes 31.6 % Final    % Monocytes 8.8 % Final    % Eosinophils 2.7 % Final    % Basophils 0.6 % Final    % Immature Granulocytes 0.5 % Final    Nucleated RBCs 0 0 /100 Final    Absolute Neutrophil 6.1 1.6 - 8.3 10e9/L Final    Absolute Lymphocytes 3.4 0.8 - 5.3 10e9/L Final    Absolute Monocytes 1.0 0.0 - 1.3 10e9/L Final    Absolute Eosinophils 0.3 0.0 - 0.7 10e9/L Final    Absolute Basophils 0.1 0.0 - 0.2 10e9/L Final    Abs Immature Granulocytes 0.1 0 - 0.4 10e9/L Final    Absolute Nucleated RBC 0.0  Final         2/11/2018  2:23 PM      Component Results     Component Value Ref Range & Units Status    Sodium 138 133 -  144 mmol/L Final    Potassium 4.3 3.4 - 5.3 mmol/L Final    Chloride 107 94 - 109 mmol/L Final    Carbon Dioxide 22 20 - 32 mmol/L Final    Anion Gap 9 3 - 14 mmol/L Final    Glucose 161 (H) 70 - 99 mg/dL Final    Urea Nitrogen 16 7 - 30 mg/dL Final    Creatinine 0.73 0.52 - 1.04 mg/dL Final    GFR Estimate 85 >60 mL/min/1.7m2 Final    Non  GFR Calc    GFR Estimate If Black >90 >60 mL/min/1.7m2 Final    African American GFR Calc    Calcium 8.7 8.5 - 10.1 mg/dL Final         2/11/2018  2:23 PM      Component Results     Component Value Ref Range & Units Status    Troponin I ES <0.015 0.000 - 0.045 ug/L Final    The 99th percentile for upper reference range is 0.045 ug/L.  Troponin values   in the range of 0.045 - 0.120 ug/L may be associated with risks of adverse   clinical events.           2/11/2018  4:35 PM      Component Results     Component Value Ref Range & Units Status    Color Urine Yellow  Final    Appearance Urine Clear  Final    Glucose Urine Negative NEG^Negative mg/dL Final    Bilirubin Urine Negative NEG^Negative Final    Ketones Urine Negative NEG^Negative mg/dL Final    Specific Gravity Urine 1.023 1.003 - 1.035 Final    Blood Urine Negative NEG^Negative Final    pH Urine 6.0 5.0 - 7.0 pH Final    Protein Albumin Urine Negative NEG^Negative mg/dL Final    Urobilinogen mg/dL 0.0 0.0 - 2.0 mg/dL Final    Nitrite Urine Negative NEG^Negative Final    Leukocyte Esterase Urine Negative NEG^Negative Final    Source Midstream Urine  Final    WBC Urine <1 0 - 2 /HPF Final    RBC Urine 1 0 - 2 /HPF Final    Squamous Epithelial /HPF Urine <1 0 - 1 /HPF Final    Mucous Urine Present (A) NEG^Negative /LPF Final         2/11/2018  4:31 PM      Component Results     Component Value Ref Range & Units Status    HCG Qual Urine Negative NEG^Negative Final    This test is for screening purposes.  Results should be interpreted along with   the clinical picture.  Confirmation testing is available if  warranted by   ordering [id number], HCG Quantitative Pregnancy.           2/11/2018  3:06 PM      Component Results     Component Value Ref Range & Units Status    Bilirubin Direct <0.1 0.0 - 0.2 mg/dL Final    Bilirubin Total 0.4 0.2 - 1.3 mg/dL Final    Albumin 4.3 3.4 - 5.0 g/dL Final    Protein Total 7.8 6.8 - 8.8 g/dL Final    Alkaline Phosphatase 65 40 - 150 U/L Final    ALT 25 0 - 50 U/L Final    AST 27 0 - 45 U/L Final         2/11/2018  3:25 PM      Component Results     Component Value Ref Range & Units Status    D Dimer 0.5 0.0 - 0.50 ug/ml FEU Final    This D-dimer assay is intended for use in conjunction with a clinical pretest   probability assessment model to exclude pulmonary embolism (PE) and deep   venous thrombosis (DVT) in outpatients suspected of PE or DVT. The cut-off   value is 0.5 ug/mL FEU.           2/11/2018  3:28 PM      Component Results     Component Value Ref Range & Units Status    TSH 1.25 0.40 - 4.00 mU/L Final         2/11/2018  4:18 PM      Narrative     CHEST TWO VIEWS  2/11/2018 4:03 PM     HISTORY: Chest pain.    COMPARISON: None.        Impression     IMPRESSION: Normal.    RADHA GREEN MD                Clinical Quality Measure: Blood Pressure Screening     Your blood pressure was checked while you were in the emergency department today. The last reading we obtained was  BP: (!) 141/96 . Please read the guidelines below about what these numbers mean and what you should do about them.  If your systolic blood pressure (the top number) is less than 120 and your diastolic blood pressure (the bottom number) is less than 80, then your blood pressure is normal. There is nothing more that you need to do about it.  If your systolic blood pressure (the top number) is 120-139 or your diastolic blood pressure (the bottom number) is 80-89, your blood pressure may be higher than it should be. You should have your blood pressure rechecked within a year by a primary care provider.  If your  systolic blood pressure (the top number) is 140 or greater or your diastolic blood pressure (the bottom number) is 90 or greater, you may have high blood pressure. High blood pressure is treatable, but if left untreated over time it can put you at risk for heart attack, stroke, or kidney failure. You should have your blood pressure rechecked by a primary care provider within the next 4 weeks.  If your provider in the emergency department today gave you specific instructions to follow-up with your doctor or provider even sooner than that, you should follow that instruction and not wait for up to 4 weeks for your follow-up visit.        Thank you for choosing [facility]       Thank you for choosing [facility] for your care. Our goal is always to provide you with excellent care. Hearing back from our patients is one way we can continue to improve our services. Please take a few minutes to complete the written survey that you may receive in the mail after you visit with us. Thank you!        [facility] Information     [facility] gives you secure access to your electronic health record. If you see a primary care provider, you can also send messages to your care team and make appointments. If you have questions, please call your primary care clinic.  If you do not have a primary care provider, please call [phone] and they will assist you.        Care EveryWhere ID     This is your Care EveryWhere ID. This could be used by other organizations to access your [facility] medical records  [id number]        Equal Access to Services     FRANC MERCADO : Hadii néstor Morris, waaxda luqadaha, qaybta kaalmada priyanka, eri nunez. So [facility] [phone].    ATENCIÓN: Si habla español, tiene a guzman disposición servicios gratuitos de asistencia lingüística. Llame al [phone].    We comply with applicable federal civil rights laws and [place] laws. We do not discriminate on the basis of race,  color, national origin, age, disability, sex, sexual orientation, or gender identity.            After Visit Summary       This is your record. Keep this with you and show to your community pharmacist(s) and doctor(s) at your next visit.

## 2018-02-12 LAB
INTERPRETATION ECG - MUSE: NORMAL

## 2019-12-04 ENCOUNTER — TELEPHONE (OUTPATIENT)
Dept: PALLIATIVE MEDICINE | Facility: CLINIC | Age: 49
End: 2019-12-04

## 2019-12-04 ENCOUNTER — OFFICE VISIT (OUTPATIENT)
Dept: FAMILY MEDICINE | Facility: CLINIC | Age: 49
End: 2019-12-04
Payer: COMMERCIAL

## 2019-12-04 VITALS
SYSTOLIC BLOOD PRESSURE: 142 MMHG | WEIGHT: 258 LBS | DIASTOLIC BLOOD PRESSURE: 81 MMHG | HEIGHT: 69 IN | RESPIRATION RATE: 14 BRPM | TEMPERATURE: 97.9 F | HEART RATE: 65 BPM | BODY MASS INDEX: 38.21 KG/M2

## 2019-12-04 DIAGNOSIS — M54.31 SCIATICA OF RIGHT SIDE: ICD-10-CM

## 2019-12-04 DIAGNOSIS — Z23 NEED FOR PROPHYLACTIC VACCINATION AND INOCULATION AGAINST INFLUENZA: Primary | ICD-10-CM

## 2019-12-04 DIAGNOSIS — M99.79 CONNECTIVE TISSUE AND DISC STENOSIS OF INTERVERTEBRAL FORAMINA OF ABDOMEN AND OTHER REGIONS: ICD-10-CM

## 2019-12-04 DIAGNOSIS — Z12.31 ENCOUNTER FOR SCREENING MAMMOGRAM FOR BREAST CANCER: ICD-10-CM

## 2019-12-04 PROCEDURE — 99214 OFFICE O/P EST MOD 30 MIN: CPT | Mod: 25 | Performed by: FAMILY MEDICINE

## 2019-12-04 PROCEDURE — 90682 RIV4 VACC RECOMBINANT DNA IM: CPT | Performed by: FAMILY MEDICINE

## 2019-12-04 PROCEDURE — 90471 IMMUNIZATION ADMIN: CPT | Performed by: FAMILY MEDICINE

## 2019-12-04 RX ORDER — GABAPENTIN 100 MG/1
300 CAPSULE ORAL AT BEDTIME
Qty: 90 CAPSULE | Refills: 3 | Status: SHIPPED | OUTPATIENT
Start: 2019-12-04 | End: 2020-01-02

## 2019-12-04 ASSESSMENT — MIFFLIN-ST. JEOR: SCORE: 1858.62

## 2019-12-04 NOTE — PATIENT INSTRUCTIONS
You may schedule your mammogram at [facility] by calling [phone] and asking to schedule your mammogram or you may schedule with [facility] in [place] by calling [phone].      Thank you for choosing [facility] today for your health care needs.     [facility] transforming primary care  At [facility], we re constantly working to improve how we serve our patients and communities. We re currently making changes to the way we deliver care. Most of the work is behind the scenes, but you ll benefit from our efforts to make it easier and more convenient to stay connected to [facility] and your care team to maintain your optimal health.    Benefits of a primary care provider  If you don t have a designated primary care provider yet, we encourage you to get to know our care team online, and find a provider you d like to see. Most of our providers have a short video on their online provider page. Visit [email or web address] to explore our providers and locations.     Benefits of having a primary care provider include:      A provider who sees you regularly is more likely to notice changes in your health.    They get to know you - your health history, family history and goals, making it easier to make a health plan together.     You get to know them - making health-related conversations and decisions easier      Primary care doctors help you when you re sick or hurt - but also focus on keeping you healthy with preventive care and screenings.     Online access to your health records and care team  [employer] is our online tool that makes it easy to see your health care information and communicate with your care team. [employer] allows you to:          View your health maintenance plan so you know when you re due for a preventive screening    Send secure messages to your care team    View your health history and visit summaries     Schedule appointments     Complete questionnaires and eCheck-in before  appointments      Get care from your provider with an e-visit      View and pay your bill     Sign up at [employer]. Once you have an account, you also can download the mobile neil.     Convenient care options   Online or by phone:     E-visit:  When you need care, but don t need a face-to-face appointment, complete an e-visit in [facility]. Your provider will respond within one business day.    Phone visit: A convenient and cost-effective option for follow-up visits or addressing common conditions. Schedule a phone visit by calling the clinic.     OnCare: Our online clinic is available 24/7 for treatment of dozens of common conditions. Complete an online interview, then a [facility] provider will respond in an hour or less. Start a visit at [email or web address].       In-person     Clinic appointments: Schedule an appointment at a clinic close to you anytime online at [email or web address] or call [address] [place] [address].     Urgent Care: Visit one of our Urgent Care locations throughout the [facility]. View locations and estimated wait times at [email or web address].

## 2019-12-04 NOTE — TELEPHONE ENCOUNTER
[facility] Management Center Referral      1. Confirmed address with patient? Yes  2. Confirmed phone number with patient? Yes  3. Confirmed referring provider? Yes  4. Is the PCP the same as the referring provider? Yes  5. Has the patient been to any previous pain clinics? No  (If yes, send ARYAN with welcome letter)  6. Which insurance are we to bill for this appointment?  [employer]    7. Informed pt of cancellation (48 hour) policy? Yes    REGARDING OPIOID MEDICATIONS: We will always address appropriateness of opioid pain medications, but we generally will not automatically take on a prescribing role. When we do take on prescribing of opioids for chronic pain, it is in collaboration with the referring physician for an intermediate period of time (months), with an expectation that the primary physician or provider will assume the prescribing role if medications are effective at stable doses with demonstrated compliance. Therefore, please do not assume that your prescribing responsibilities end on the day of pain clinic consultation.  8. Informed pt of prescribing policy? Yes    9.Please be aware that once you are established with a pain provider and location, you will need to continue have all future visits with that provider and location. It is best to determine what location is the most convenient for you and schedule with that one.    ** PATIENT INFORMED OF THIS POLICY Yes      9. Referring Provider: Shereen Brooks    [facility]

## 2019-12-04 NOTE — PROGRESS NOTES
Subjective     Taylor Dunlap is a 49 year old female who presents to clinic today for the following health issues:    She has back surgery in 1999.   In 2017 she had more pain and had MRI and then one epidural steroid injection which only helped for 2 weeks.   She did NOT have anymore.   She does not take anything for pain.   She finds sleep hard,   The pain is mostly down her right leg.   She finds that there is more weakness there too now.   Her mother has a spinal stimulator.   In 2017 fusion was recommended but she was reluctant.       HPI   Back Pain-Chronic      Duration: chronic, years        Specific cause: giving birth to her first son X 27 years ago    Description:   Location of pain: low back right  Character of pain: dull ache, stabbing and constant  Pain radiation:radiates into the right buttocks, radiates into the right leg and radiates into the right foot  New numbness or weakness in legs, not attributed to pain:  no     Intensity: Currently 2/10    History:   Pain interferes with job: YES-difficulty sitting long periods   History of back problems: previous spinal stenosis -L3, L4, L5, Degenerative disc   Any previous MRI or X-rays: Yes- at [facility].  Date 5/11/2017-XRAY, 5/10/17-MRI  Sees a specialist for back pain:  No  Therapies tried without relief: Physical Therapy and steroid injection    Alleviating factors:   Improved by: heat and rest      Precipitating factors:  Worsened by: Bending, Standing, Sitting and Walking          Accompanying Signs & Symptoms:  Risk of Fracture:  None  Risk of Cauda Equina:  None  Risk of Infection:  None  Risk of Cancer:  None  Risk of Ankylosing Spondylitis:  Onset at age <35, male, AND morning back stiffness. no        Past Medical History:   Diagnosis Date     NO ACTIVE PROBLEMS      Skin cancer, basal cell 2013       Past Surgical History:   Procedure Laterality Date     C NONSPECIFIC PROCEDURE  4/99    L4-5 laminectomy & microdiscectomy ([place])  "      MEDICATIONS:  Current Outpatient Medications   Medication     gabapentin (NEURONTIN) 100 MG capsule     Acetaminophen (TYLENOL PO)     Aspirin-Acetaminophen-Caffeine (EXCEDRIN PO)     ibuprofen (MOTRIN IB) 200 MG tablet     No current facility-administered medications for this visit.        SOCIAL HISTORY:  Social History     Tobacco Use     Smoking status: Former Smoker     Packs/day: 0.50     Years: 10.00     Pack years: 5.00     Types: Cigarettes     Last attempt to quit: 4/1/2009     Years since quitting: 10.6     Smokeless tobacco: Never Used     Tobacco comment: quit 11/2018   Substance Use Topics     Alcohol use: Yes     Comment: occasional; couple glasses wine on weekends.       Family History   Problem Relation Age of Onset     Arthritis Brother      Cerebrovascular Disease Maternal Grandfather      C.A.D. Paternal Grandfather         MI; 50's     Arthritis Paternal Grandmother         RA     Brain Cancer Paternal Aunt         R.A     Arthritis Paternal Aunt             Review of Systems   ROS COMP: Constitutional, HEENT, cardiovascular, pulmonary, gi and gu systems are negative, except as otherwise noted.      Objective    BP (!) 148/89 (BP Location: Right arm, Patient Position: Chair, Cuff Size: Adult Large)   Pulse 65   Temp 97.9  F (36.6  C) (Oral)   Resp 14   Ht 1.759 m (5' 9.25\")   Wt 117 kg (258 lb)   BMI 37.83 kg/m    Body mass index is 37.83 kg/m .  Physical Exam   GENERAL: healthy, alert and no distress  EYES: Eyes grossly normal to inspection, PERRL and conjunctivae and sclerae normal  HENT: ear canals and TM's normal, nose and mouth without ulcers or lesions  NECK: no adenopathy, no asymmetry, masses, or scars and thyroid normal to palpation  RESP: lungs clear to auscultation - no rales, rhonchi or wheezes  CV: regular rate and rhythm, normal S1 S2, no S3 or S4, no murmur, click or rub, no peripheral edema and peripheral pulses strong  MS: no gross musculoskeletal defects noted, no " edema  SKIN: no suspicious lesions or rashes  NEURO: Normal strength and tone, mentation intact and speech normal  PSYCH: mentation appears normal, affect normal/bright  LYMPH: no cervical, supraclavicular, axillary, or inguinal adenopathy  Back: symmetric without point tenderness  Positive straight leg raise on the right  Strength in lower extremities symmetric bilaterally and sensation to light touch grossly intact    Diagnostic Test Results:  Labs reviewed in Epic  Reviewed 2017 MRI with mild and moderate stenosis at 3 levels        Assessment:  1. Spinal stenosis - chronic back pain  2. Sciatica on right side  3.  hypertension - new dx  -  Only first time  4. HCM      Plan:  1. Flu  Shot  2. Pain cliinic referral for possible stimulator  3.  MRI of spine since none since 2017  4. Gabapentin start at 100 and work to 300 if needed before bed  5. The potential side effects of the prescription medication were discussed with the patient.  6. Recheck blood pressure and physical next month

## 2019-12-05 ENCOUNTER — HOSPITAL ENCOUNTER (OUTPATIENT)
Dept: MRI IMAGING | Facility: CLINIC | Age: 49
Discharge: HOME | End: 2019-12-05
Attending: FAMILY MEDICINE | Admitting: FAMILY MEDICINE
Payer: COMMERCIAL

## 2019-12-05 DIAGNOSIS — M54.31 SCIATICA OF RIGHT SIDE: ICD-10-CM

## 2019-12-05 PROCEDURE — 72148 MRI LUMBAR SPINE W/O DYE: CPT

## 2019-12-15 ENCOUNTER — HEALTH MAINTENANCE LETTER (OUTPATIENT)
Age: 49
End: 2019-12-15

## 2020-01-02 ENCOUNTER — OFFICE VISIT (OUTPATIENT)
Dept: PALLIATIVE MEDICINE | Facility: CLINIC | Age: 50
End: 2020-01-02
Attending: FAMILY MEDICINE
Payer: COMMERCIAL

## 2020-01-02 VITALS — DIASTOLIC BLOOD PRESSURE: 89 MMHG | SYSTOLIC BLOOD PRESSURE: 160 MMHG | HEART RATE: 66 BPM | OXYGEN SATURATION: 98 %

## 2020-01-02 DIAGNOSIS — M54.16 LUMBAR RADICULOPATHY: Primary | ICD-10-CM

## 2020-01-02 DIAGNOSIS — M79.661 PAIN OF RIGHT LOWER LEG: ICD-10-CM

## 2020-01-02 PROCEDURE — 99204 OFFICE O/P NEW MOD 45 MIN: CPT | Performed by: PHYSICAL MEDICINE & REHABILITATION

## 2020-01-02 RX ORDER — GABAPENTIN 300 MG/1
300 CAPSULE ORAL 3 TIMES DAILY
Qty: 90 CAPSULE | Refills: 0 | Status: SHIPPED | OUTPATIENT
Start: 2020-01-02 | End: 2020-01-22

## 2020-01-02 ASSESSMENT — PAIN SCALES - GENERAL: PAINLEVEL: MILD PAIN (3)

## 2020-01-02 NOTE — PROGRESS NOTES
"                      [facility] Consultation    Date of visit: 1/2/2020    Reason for consultation:    Taylor Dunlap is a 49 year old female who is seen in consultation today at the request of her primary care physician, Shereen Brooks.     Consultation and Evaluation for: \"patient is interested in spinal stimulator \"    Review of [place] Prescription Monitoring Program (): Today I have also reviewed the patient's history of controlled substance use, as provided by [place] licensed pharmacies and prescriber dispensers.     Review of Pain Questionnaire: Please see the [facility] health questionnaire, which the patient completed and reviewed with me in detail.    Review of Electronic Chart: Today I have also reviewed available medical information in the patient's medical record at [facility] ([facility]), including relevant provider notes, laboratory work, and imaging.     Taylor Dunlap has not been seen at a pain clinic in the past.      Chief Complaint:    Chief Complaint   Patient presents with     Pain       Pain history:  Taylor Dunlap is a 49 year old female who presents for initial evaluation of chief pain complaint of right leg pain.     27 years ago she began having moderate back pain and upper back pain that was gradually worsening, this was after giving birth to her first son. About 6-7 years later she started seeking treatment for this pain and was found to have a ruptured L4-5 disc that was presumably causing her symptoms. She was told that it was possibly due to position of her baby prior to birth. She underwent discectomy and laminectomy at that time and this helped with the back pain, and she still doesn't have much low back pain unless she is standing prolonged.    She began having right leg pain after her 3rd child was born about 19 years ago. This pain is slowly getting worse over the past few years. Whenever she walks too long or exercises she has a " "pain flare. She also notices weakness in her legs and is concerned that this is going to progress as well. When she is in the same position too long her leg gets painful and uncomfortable.     She did have an epidural injection in 2017 but that only helped for 3 weeks. She was told by Dr. Healy at that time that surgery would be beneficial but she was hesitant as the prior surgery she had took quite a bit of time to recover from. Her mother has similar issues and repeat surgeries have not been very helpful for her so she is concerned about this as well.      Onset: jan 11, 1993  Location/Radiation: back, now right leg  Quality: \"constant dull pain with times of sharp pain\" \"constant\"  Severity/Intensity: 8/10 at worst, 2/10 at best, 4/10 on average  Aggravating factors include: \"standing too long - walking\" \"Sitting too jimbo g- cleaning my house)  Relieving factors include: \"laying down\"  Red Flags: The patient denies bowel or bladder incontinence, parasthesias, weakness, saddle anesthesia, unintentional weight loss, or fever/chills/sweats.         Pain Treatments:  1. Medications:       Current pain medications:  None       Previous pain medications:  -oxycodone helpful in [facility], never took chronically  -Ibuprofen - helpful  -Celebrex - didn't help  2. Physical Therapy: helpful in [facility]     TENS unit: hasn't tried  3. Pain psychology: hasn't tried  4. Surgery: laminectomy/discectomy 1999/2000, helped with back pain  5. Injections: epidural in 2017 didn't help leg pain  6. Alternative Therapies:    Chiropractic: helpful    Acupuncture: hasn't tried      Diagnostic tests:  MRI of L-Spinewas completed on 12/5/19 was reviewed with the patient and shows:  FINDINGS: No change in alignment. Trace retrolisthesis of L2 on L3 on  a degenerative basis, similar to prior. No acute fracture. The  vertebral body heights are maintained. Probable intraosseous  hemangioma in the posterior inferior aspect of the L2 vertebral " body.  No concerning focal marrow signal abnormalities.     Moderate disc height loss at L2-L3 and L4-L5 with mild to moderate  disc height loss at L3-L4 and mild disc height loss at L5-S1. The  conus terminates at T12-L1. The paraspinous soft tissues are within  normal limits.     Segmental analysis:  T12-L1: No spinal or neural foraminal stenosis. No change.     L1-L2: No disc bulge or herniation. Mild facet arthropathy. No spinal  or neural foraminal stenosis. No change.     L2-L3: Small symmetric disc bulge with mild facet arthropathy. No  significant spinal or neural foraminal stenosis. No significant  change.     L3-L4: There is a small disc bulge, slightly eccentric to the left,  with left foraminal/extraforaminal disc protrusion, as before. Mild  facet arthropathy. Mild ligamentum flavum thickening. Mild  encroachment on the lateral recesses without central spinal stenosis.  No significant neural foraminal stenosis. The findings are not  significantly changed.     L4-L5: Postsurgical changes of previous laminotomy are again shown.  There is a medium sized disc bulge, slightly eccentric to the right,  with superimposed central disc herniation. The herniated disc contents  appear slightly less pronounced than the prior examination. There is  moderate facet arthropathy. There is moderate encroachment on the  lateral recesses with mild overall spinal stenosis. Mild bilateral  neural foraminal stenosis continues to be present.     L5-S1: No significant disc bulge or herniation. Moderate facet  arthropathy. Mild left neural foraminal stenosis. The right neural  foramen and spinal canal are patent. The findings are not  significantly changed.                                                                      IMPRESSION:  1. Findings of previous L4-L5 laminotomy for microdiscectomy.  2. Multilevel degenerative disc disease and facet arthropathy of the  lumbar spine overall appears similar to the prior examination.  Please  see the body of the report for additional details and level-by-level  findings.     JERONIMO PAZ MD    Labs:   Lab Results   Component Value Date    WBC 10.8 02/11/2018     Lab Results   Component Value Date    RBC 4.45 02/11/2018     Lab Results   Component Value Date    HGB 13.9 02/11/2018     Lab Results   Component Value Date    HCT 42.7 02/11/2018     Lab Results   Component Value Date    MCV 96 02/11/2018     Lab Results   Component Value Date    MCH 31.2 02/11/2018     Lab Results   Component Value Date    MCHC 32.6 02/11/2018     Lab Results   Component Value Date    RDW 12.7 02/11/2018     Lab Results   Component Value Date     02/11/2018     Last Comprehensive Metabolic Panel:  Sodium   Date Value Ref Range Status   02/11/2018 138 133 - 144 mmol/L Final     Potassium   Date Value Ref Range Status   02/11/2018 4.3 3.4 - 5.3 mmol/L Final     Chloride   Date Value Ref Range Status   02/11/2018 107 94 - 109 mmol/L Final     Carbon Dioxide   Date Value Ref Range Status   02/11/2018 22 20 - 32 mmol/L Final     Anion Gap   Date Value Ref Range Status   02/11/2018 9 3 - 14 mmol/L Final     Glucose   Date Value Ref Range Status   02/11/2018 161 (H) 70 - 99 mg/dL Final     Urea Nitrogen   Date Value Ref Range Status   02/11/2018 16 7 - 30 mg/dL Final     Creatinine   Date Value Ref Range Status   02/11/2018 0.73 0.52 - 1.04 mg/dL Final     GFR Estimate   Date Value Ref Range Status   02/11/2018 85 >60 mL/min/1.7m2 Final     Comment:     Non  GFR Calc     Calcium   Date Value Ref Range Status   02/11/2018 8.7 8.5 - 10.1 mg/dL Final     Bilirubin Total   Date Value Ref Range Status   02/11/2018 0.4 0.2 - 1.3 mg/dL Final     Alkaline Phosphatase   Date Value Ref Range Status   02/11/2018 65 40 - 150 U/L Final     ALT   Date Value Ref Range Status   02/11/2018 25 0 - 50 U/L Final     AST   Date Value Ref Range Status   02/11/2018 27 0 - 45 U/L Final                 Past Medical  History:  Past Medical History:   Diagnosis Date     NO ACTIVE PROBLEMS      Skin cancer, basal cell 2013       Past Surgical History:  Past Surgical History:   Procedure Laterality Date     C NONSPECIFIC PROCEDURE  4/99    L4-5 laminectomy & microdiscectomy ([place])       Medications:  Current Outpatient Medications   Medication Sig Dispense Refill     Acetaminophen (TYLENOL PO)        Aspirin-Acetaminophen-Caffeine (EXCEDRIN PO)        gabapentin (NEURONTIN) 300 MG capsule Take 1 capsule (300 mg) by mouth 3 times daily 90 capsule 0     ibuprofen (MOTRIN IB) 200 MG tablet Take 200 mg by mouth every 4 hours as needed for mild pain Reported on 4/28/2017         Allergies:     Allergies   Allergen Reactions     No Known Drug Allergies        Social History:  Home situation: Lives with  in [place]  Occupation/Schooling:   Tobacco use: denies  Drug use: denies   Alcohol use: 1 drink/night  History of chemical dependency treatment: denies  Mental health admissions: denies    Family history:  Family History   Problem Relation Age of Onset     Arthritis Brother      Cerebrovascular Disease Maternal Grandfather      C.A.D. Paternal Grandfather         MI; 50's     Arthritis Paternal Grandmother         RA     Brain Cancer Paternal Aunt         R.A     Arthritis Paternal Aunt        Review of Systems:    POSTIVE IN BOLD  GENERAL: fever/chills, fatigue, general unwell feeling, weight gain/loss.  HEAD/EYES:  headache, dizziness, or vision changes.    EARS/NOSE/THROAT:  Nosebleeds, hearing loss, sinus infection, earache, tinnitus.  IMMUNE:  Allergies, cancer, immune deficiency, or infections.  SKIN:  Urticaria, rash, hives  HEME/Lymphatic:   anemia, easy bruising, easy bleeding.  RESPIRATORY:  cough, wheezing, or shortness of breath  CARDIOVASCULAR/Circulation:  Extremity edema, syncope, hypertension, tachycardia, or angina.  GASTROINTESTINAL:  abdominal pain, nausea/emesis, diarrhea, constipation,   hematochezia, or melena.  ENDOCRINE:  Diabetes, steroid use,  thyroid disease or osteoporosis.  MUSCULOSKELETAL: neck pain, back pain, arthralgia, arthritis, or gout.  GENITOURINARY:  frequency, urgency, dysuria, difficulty voiding, hematuria or incontinence.  NEUROLOGIC:  weakness, numbness, paresthesias, seizure, tremor, stroke or memory loss.  PSYCHIATRIC:  depression, anxiety, stress, suicidal thoughts or mood swings.     Physical Exam:  Vitals:    01/02/20 1400   BP: (!) 160/89   Pulse: 66   SpO2: 98%     Exam:  Constitutional: Well developed, well nourished, appears stated age.  HEENT: Head atraumatic, normocephalic. Eyes without conjunctival injection or jaundice. Oropharynx clear. Neck supple. No obvious neck masses.  Cardiovascular: Regular rate/rhythm; no murmurs/rubs/gallops appreciated.  Respiratory: Lungs clear to auscultation bilaterally, no wheezing/rales/rhonchi.   Gastrointestinal: Normoactive bowel sounds. Abdomen soft, non-tender, without guarding/rebound.  Skin: No rash, lesions, or petechiae of exposed skin.   Extremities: Peripheral pulses intact. No clubbing, cyanosis, or edema.  Psychiatric/mental status: Alert, without lethargy or stupor. Speech fluent. Appropriate affect. Mood normal. Able to follow commands without difficulty.     Musculoskeletal exam:  Gait/Station/Posture: Normal stance, arm swing, and stride; no antalgia or Trendelenburg  Normal bulk and tone. Unremarkable spinal curvature. ASIS heights even.       Lumbar spine:  Range of motion within normal limits    Rotation/ext to right: pain free   Rotation/ext to left: pain free  Myofascial tenderness:  None in the lumbar paraspinals or gluteal muscles  Focal tenderness: No SI joint, gluteal, piriformis, GT, or IT tenderness  SLR: positive on the right  Joaquin's maneuver: negative    Hip exam:   normal internal and external range of motion bilaterally. OSCAR negative. Scour negative.       Neurologic exam:  CN:  Cranial nerves  2-12 are grossly intact  Motor Strength:  5/5 symmetric UE and LE strength, able to heel and toe walk without difficulty  Reflexes:     Biceps C5:     R:  2/4 L: 2/4   Brachioradialis  C6: R:  2/4 L: 2/4   Triceps C7:  R:  2/4 L: 2/4   Patella L4:  R:  2/4 L: 2/4   Achilles S1:  R:  2/4 L: 2/4    Other reflexes:  Toes downgoing,No ankle clonus    Sensory:  (upper and lower extremities):   Light touch: normal     Assessment:  Mrs. Dunlap is a 49 year old with past medical history including: Obesity who presents for evaluation and treatment of the followin. Chronic right leg pain: She reports a 20 year history of chronic right leg pain prior to which she underwent a L4-5 discectomy/laminectomy (27 years ago) which helped significantly with chronic back pain. The right leg pain has been persistent over the years and slowly progressing. MRI shows a broad disc protrusion at L4-5 which may be contacting/irritating the traversing L5 nerve at this level causing her symptoms. Based on history and exam the different for her symptoms includes: L5 radiculopathy, sciatica caused by piriformis dysfunction, chronic myofascial pain.     She questioned use of a spinal cord stimulator system to manage her pain today but given the lack of conservative options tried(No neuropathic pain medications, no PT recently, last epidural in 2017) I suggested we try this before moving onto something more invasvie, she was agreeable with the plan below.      Mental Health - the patient's mental health concerns, specifically anxiety, affect her experience of pain and contribute to her clinically significant distress.        Plan:  The following recommendations were given to the patient. Diagnosis, treatment options, risks, benefits, and alternatives were discussed, and all questions were answered. The patient expressed understanding of the plan for management.     I am recommending a multidisciplinary treatment plan to help this patient  better manage her pain.  This includes:     1. Physical Therapy: Referred to chronic pain PT  2. Clinical Health Pain Psychologist: Referred to pain phd   1. Therapy can help reduce physical and psychosocial triggers or reinforcers of pain by adapting thoughts, feelings and behaviors to reduce symptoms and increase quality of life.  Evidence indicates that the practice of relaxation, meditation, and mindfulness techniques can significantly affect pain levels and overall well being.  3. Self Care Recommendations: Recommended a gradual gentle progressive exercise that does not increase pain - gradually increase daily walking program.  Take mini breaks - 5 minutes of mindfullness a couple times a day.   4. Diagnostic Studies: None, lumbar MRI reviewed with patient  5. Medication Management:   1. Start gabapentin 300mg HS and increase in 300mg increments every 3-4 days until taking 300mg TID.  6. Further procedures recommended: L5-S1 taylor ordered  7. Follow up: 1 month after epidural in clinic      I spent 45 minutes of time face to face with the patient.  Greater than 50% of this time was spent in patient counseling and/or coordination of care regarding principles of multidisciplinary care, medication management, and treatment options as discussed above.         Joseph Rebollar,   [facility]

## 2020-01-02 NOTE — Clinical Note
Patient will work with pain PT and pain phd, will start gabapentin and try repeating epidural.Thanks for the referral,[facility]

## 2020-01-02 NOTE — PATIENT INSTRUCTIONS
1. I ordered an epidural injection, my clinic will call you to schedule.    2. I ordered pain PT and pain psychology, my clinic will call you to schedule this as well.    3. Start gabapentin 300mg at night and increase as below:  Start gabapentin as follows:  1 tab= 300mg    AM   PM   Bedtime  0   0   300mg (1 tab).  After 1-3 days, increase as tolerated to the next line  300mg (1 tab)  0   300 (1 tab).  After 3-4 days, increase as tolerated to the next line  300mg (1 tab)  300mg (1 tab)  300 (1 tab).     Call with any problems that you think may be related to this medication.    Avoid activities that require mental alertness or coordination until you realize the effects of gabapentin as it can cause dizziness, sleepiness, fatigue, word finding difficulties and incoordination.            ----------------------------------------------------------------  Clinic Number:  [phone]     Call with any questions about your care and for scheduling assistance.     Calls are returned Monday through Friday between 8 AM and 4:30 PM. We usually get back to you within 2 business days depending on the issue/request.    If we are prescribing your medications:    For opioid medication refills, call the clinic or send a [employer] message 7 days in advance.  Please include:    Name of requested medication    Name of the pharmacy.    For non-opioid medications, call your pharmacy directly to request a refill. Please allow 3-4 days to be processed.     Per [place] State Law:    All controlled substance prescriptions must be filled within 30 days of being written.      For those controlled substances allowing refills, pickup must occur within 30 days of last fill.      We believe regular attendance is key to your success in our program!      Any time you are unable to keep your appointment we ask that you call us at least 24 hours in advance to cancel.This will allow us to offer the appointment time to another patient.     Multiple missed  appointments may lead to dismissal from the clinic.

## 2020-01-03 ENCOUNTER — TELEPHONE (OUTPATIENT)
Dept: PALLIATIVE MEDICINE | Facility: CLINIC | Age: 50
End: 2020-01-03
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
## 2020-01-03 NOTE — TELEPHONE ENCOUNTER
Called Taylor.  She takes 2 Excedrin in the morning and 2 Excedrin in the pm. Looked up how much aspirin is in Excedrin. Consulted with a nurse colleague.    Advised she can only take one tablet of Excedrin 6 days prior to injection.   Discussed need for . If becomes sick/ill/infection or started on ABX or steroids that we will need to be contacted for rescheduling. No vaccinations 7 days before or after. States no further questions.  Pt scheduled for 01/13/19 at 1515 with Dr Rebollar.      Sabina Leslie RN  Care Coordinator  [facility]

## 2020-01-06 NOTE — TELEPHONE ENCOUNTER
Routing to provider as an FYI. See note below.    Sabina Leslie RN  Care Coordinator  [facility]

## 2020-01-09 ENCOUNTER — OFFICE VISIT (OUTPATIENT)
Dept: PALLIATIVE MEDICINE | Facility: CLINIC | Age: 50
End: 2020-01-09
Payer: COMMERCIAL

## 2020-01-09 DIAGNOSIS — G89.4 CHRONIC PAIN SYNDROME: ICD-10-CM

## 2020-01-09 DIAGNOSIS — M79.661 PAIN OF RIGHT LOWER LEG: ICD-10-CM

## 2020-01-09 DIAGNOSIS — M79.661 PAIN OF RIGHT LOWER LEG: Primary | ICD-10-CM

## 2020-01-09 DIAGNOSIS — M54.16 LUMBAR RADICULOPATHY: ICD-10-CM

## 2020-01-09 DIAGNOSIS — M54.16 LUMBAR RADICULOPATHY: Primary | ICD-10-CM

## 2020-01-09 PROCEDURE — 97162 PT EVAL MOD COMPLEX 30 MIN: CPT | Mod: GP | Performed by: PHYSICAL THERAPIST

## 2020-01-09 PROCEDURE — 96156 HLTH BHV ASSMT/REASSESSMENT: CPT | Performed by: PSYCHOLOGIST

## 2020-01-09 PROCEDURE — 97530 THERAPEUTIC ACTIVITIES: CPT | Mod: GP | Performed by: PHYSICAL THERAPIST
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
## 2020-01-09 NOTE — PROGRESS NOTES
"PHYSICAL THERAPY INITIAL EVALUATION and PLAN OF CARE    Patient Name: Taylor Dunlap     : 1970    MRN: [id number]   Pain Management Provider:  Joseph Rebollar MD    Diagnosis:    Lumbar radiculopathy  Pain of right lower leg    SUBJECTIVE:    PRESENTATION AND ETIOLOGY    Chief Complaint: right leg pain    Per pain provider assessment:  She reports a 20 year history of chronic right leg pain prior to which she underwent a L4-5 discectomy/laminectomy (27 years ago) which helped significantly with chronic back pain. The right leg pain has been persistent over the years and slowly progressing. MRI shows a broad disc protrusion at L4-5 which may be contacting/irritating the traversing L5 nerve at this level causing her symptoms. Based on history and exam the different for her symptoms includes: L5 radiculopathy, sciatica caused by piriformis dysfunction, chronic myofascial pain.     Onset: 1993  Location/Radiation: back, now right leg  Quality: \"constant dull pain with times of sharp pain\" \"constant\"  Severity/Intensity: 8/10 at worst, 2/10 at best, 4/10 on average  Fatigue level: 5/10 average  Aggravating factors include: \"standing too long - walking\" \"Sitting too long- cleaning my house)  Relieving factors include: \"laying down\"    LEVEL OF FUNCTION AT START OF CARE  Walking tolerance: 20'   Sitting tolerance: 60'  Standing tolerance: 15'  Housework tolerance: 30' active; vacuuming, sweeping, using right leg to get down and up from scrubbing floor  Sleep: delayed onset 30-45'    CURRENT / PREVIOUS INTERVENTION(S):  2. Physical Therapy: helpful in the past                TENS unit: hasn't tried  3. Pain psychology: hasn't tried  4. Surgery: laminectomy/discectomy , helped with back pain  5. Injections: epidural in 2017 didn't help leg pain  6. Alternative Therapies:               Chiropractic: helpful               Acupuncture: hasn't tried   DEMOGRAPHICS  Home situation: Lives with "  in [place]  Occupation/Schooling:   Pertinent Medical  / Surgical History: Epic Snapshot Reviewed, See provider's note    Patient's goals for physical therapy: be able to cook a meal, have a walking program, improve mood and have more energy    ===============================================================  OBJECTIVE:  POSTURE:  Observation: Patient demonstrates forward head, protracted shoulders and thoracic kyphosis in standing and sitting posture.  GAIT, LOCOMOTION, and BALANCE:  Gait and Locomotion: mild antalgic gait with WB on right LE  Balance: next  RANGE OF MOTION: next  MUSCLE PERFORMANCE:   Strength: demonstrates core instability  Flexibility: tightness noted in next  FUNCTIONAL TESTING/OBSERVATION: guarded and slow mobility with chair mechanics  Pain behaviors: None    ===============================================================  Today's Treatment:  Initial evaluation  Therapeutic Activity:   For 30 minutes including issued self care handout, Pt educated on the concept of the nervous system as a hypersensitive and hyperactive alarm system and the role of physical therapy in desensitizing the nerves and reducing pain.  Patient was educated about the relationship between tissue injury and pain, and that pain is an output by the brain.  This includes the principle that tissue injury does not have to hurt, and that you can have pain without having tissue injury, especially with hypersensitivity in the nervous system.   Focus next session: self cares, walking, body scan, HEP (patient to bring handouts)  ===============================================================  ASSESSMENT:  Physical Therapy Diagnosis:Impaired Posture and Impaired Muscle Performance    Patient requires PT intervention for the following impairments: Limited knowledge of condition and / or self care - inability to control symptoms, Impaired functional mobility, Impaired gait / weight bearing tolerance,  Impaired posture / muscle imbalance, Muscle strength and endurance limitations, Pain, ROM limitations and Deconditioning    Anticipated Goals and Expected Outcomes:  8 weeks  Patient will report the use of 2 self care practices during their day.  Patient will report the participation in 30 minutes of aerobic activity daily and practicing stretching daily.  Patient will demonstrate the ability to find core strength in neutral posture.  Patient will demonstrate the ability to relax muscle group before stretching.  16 weeks  Patient will be independent with a home exercise program.  Patient will be independent with posture correction.  Patient will report independence with a self care/flare management program.  Patient will demonstrate improved functional strength and endurance as reports by increased tolerance for IADLs and more consistent participation in daily activity.     Rehab potential for achieving goals: good.    ===============================================================  PLAN:   Patient will benefit from skilled physical therapy consisting of:  neuromuscular reeducation of: kinesthetic sense and posture for sitting and/or standing activities, education in self care / home management training to include instruction in: symptom control techniques, therapeutic activities to achieve improved functional performance in: daily actvities and therapeutic exercise to develop: strength and endurance, flexibility and core stability    Assessment will be ongoing with changes in treatment as indicated.  Benefits/risks/alternatives to treatment have been reviewed and the patient has been instructed to contact this office if they have any questions or concerns.  This plan of care has been discussed with the patient and the patient is in agreement.     Frequency / Duration:  Patient will be seen for a total of 6 visits; 12 weeks    Total Visit Time: 45  minutes            Marisol Morejon, PT                                     Date:  1/9/2020      =====================================================  **  Referring Provider Certification: Referring Provider reviewing certifies that the above treatment / plan of care is required and authorized, and that the patient's plan will be reviewed every thirty (30) days **.   ======================================================     PRESENT:  NA    MULTIDISCIPLINARY PATIENT / FAMILY EDUCATION RECORD  Department:  Physical Therapy    Readiness to Learn: Ability to understand verbal instructions, Ability to understand written instructions, Knowledge of educational needs / treatment plan  Specific Barriers to Learning: None  Referrals: None  Learning Needs: Rehabilitation techniques to improve functional independence Pain management education to improve daily activity tolerance.  Who: Patient  How: Demonstration, Verbal instructions, Written instructions  Response: Appropriate verbal response, Asked questions, Demonstrated ability, Verbalized recall / understanding

## 2020-01-13 ENCOUNTER — ANCILLARY PROCEDURE (OUTPATIENT)
Dept: GENERAL RADIOLOGY | Facility: CLINIC | Age: 50
End: 2020-01-13
Attending: PHYSICAL MEDICINE & REHABILITATION
Payer: COMMERCIAL

## 2020-01-13 ENCOUNTER — RADIOLOGY INJECTION OFFICE VISIT (OUTPATIENT)
Dept: PALLIATIVE MEDICINE | Facility: CLINIC | Age: 50
End: 2020-01-13
Payer: COMMERCIAL

## 2020-01-13 VITALS — SYSTOLIC BLOOD PRESSURE: 125 MMHG | HEART RATE: 54 BPM | DIASTOLIC BLOOD PRESSURE: 76 MMHG | OXYGEN SATURATION: 99 %

## 2020-01-13 DIAGNOSIS — M54.16 LUMBAR RADICULOPATHY: Primary | ICD-10-CM

## 2020-01-13 DIAGNOSIS — M54.16 LUMBAR RADICULOPATHY: ICD-10-CM

## 2020-01-13 PROCEDURE — 64483 NJX AA&/STRD TFRM EPI L/S 1: CPT | Mod: RT | Performed by: PHYSICAL MEDICINE & REHABILITATION

## 2020-01-13 NOTE — NURSING NOTE
Discharge Information    IV Discontiued Time:  NA    Amount of Fluid Infused:  NA    Discharge Criteria = When patient returns to baseline or as per MD order    Consciousness:  Pt is fully awake    Circulation:  BP +/- 20% of pre-procedure level    Respiration:  Patient is able to breathe deeply    O2 Sat:  Patient is able to maintain O2 Sat >92% on room air    Activity:  Moves 4 extremities on command    Ambulation:  Patient is able to stand and walk or stand and pivot into wheelchair    Dressing:  Clean/dry or No Dressing    Notes:   Discharge instructions and AVS given to patient    Patient meets criteria for discharge?  YES    Admitted to PCU?  No    Responsible adult present to accompany patient home?  Yes    Signature/Title:    Ana Rangel RN  RN Care Coordinator  [facility]

## 2020-01-13 NOTE — PATIENT INSTRUCTIONS
[facility] Procedure Discharge Instructions    Today you saw:    Dr. Joseph Rebollar    Your procedure:  Epidural steroid injection     Medications used:  Lidocaine (anesthetic)  Dexamethasone (steroid) Omnipaque (contrast)            Be cautious when walking as numbness and/or weakness in the legs may occur up to 6-8 hours after the procedure due to effect of the local anesthetic    Do not drive for 6 hours. The effect of the local anesthetic could slow your reflexes.     Avoid strenuous activity for the first 24 hours. You may resume your regular activities after that.     You may shower, however avoid swimming, tub baths or hot tubs for 24 hours following your procedure    You may have a mild to moderate increase in pain for several days following the injection.      You may use ice packs for 10-15 minutes, 3 to 4 times a day at the injection site for comfort    Do not use heat to painful areas for 6 to 8 hours. This will give the local anesthetic time to wear off and prevent you from accidentally burning your skin.    Unless you have been directed to avoid the use of anti-inflammatory medications (NSAIDS-ibuprofen, Aleve, Motrin), you may use these medications or Tylenol for pain control if needed.     With diabetes, check your blood sugar more frequently than usual as your blood sugar may be higher than normal for 10-14 days following a steroid injection. Contact your doctor who manages your diabetes if your blood sugar is higher than usual    Possible side effects of steroids that you may experience include flushing, elevated blood pressure, increased appetite, mild headaches and restlessness.  All of these symptoms will get better with time.    It may take up to 14 days for the steroid medication to start working although you may feel the effect as early as a few days after the procedure.     Follow up with your referring provider in 2-3 weeks      If you experience any of the following,  call the pain center line during work hours at [phone] or on-call physician after hours at [phone]:  -Fever over 100 degree F  -Swelling, bleeding, redness, drainage, warmth at the injection site  -Progressive weakness or numbness in your legs   -Loss of bowel or bladder function  -Unusual headache that is not relieved by Tylenol or your regular headache medication  -Unusual new onset of pain that is not improving

## 2020-01-13 NOTE — PROGRESS NOTES
[facility] - Procedure Note    Date of Service: 1/13/2020    Procedure performed: Right L5 transforaminal epidural steroid injection with fluoroscopic guidance  Diagnosis: Lumbar spondylosis; Lumbar radiculitis/radiculopathy  : Joseph Rebollar DO   Anesthesia: none    Indications: Taylor Dunlap is a 49 year old female who is seen for lumbar transforaminal epidural steroid injection. The patient describes pain in her right leg to the foot, this radiates down from her back. The patient has been exhibiting symptoms consistent with lumbar intraspinal inflammation and radiculopathy. Symptoms have been persistent, disabling, and intermittently severe. The patient reports minimal improvement with conservative treatment, including oral medication and PT.    Lumbar MRI was done on 12/5/19 which showed   FINDINGS: No change in alignment. Trace retrolisthesis of L2 on L3 on  a degenerative basis, similar to prior. No acute fracture. The  vertebral body heights are maintained. Probable intraosseous  hemangioma in the posterior inferior aspect of the L2 vertebral body.  No concerning focal marrow signal abnormalities.     Moderate disc height loss at L2-L3 and L4-L5 with mild to moderate  disc height loss at L3-L4 and mild disc height loss at L5-S1. The  conus terminates at T12-L1. The paraspinous soft tissues are within  normal limits.     Segmental analysis:  T12-L1: No spinal or neural foraminal stenosis. No change.     L1-L2: No disc bulge or herniation. Mild facet arthropathy. No spinal  or neural foraminal stenosis. No change.     L2-L3: Small symmetric disc bulge with mild facet arthropathy. No  significant spinal or neural foraminal stenosis. No significant  change.     L3-L4: There is a small disc bulge, slightly eccentric to the left,  with left foraminal/extraforaminal disc protrusion, as before. Mild  facet arthropathy. Mild ligamentum flavum thickening. Mild  encroachment on  the lateral recesses without central spinal stenosis.  No significant neural foraminal stenosis. The findings are not  significantly changed.     L4-L5: Postsurgical changes of previous laminotomy are again shown.  There is a medium sized disc bulge, slightly eccentric to the right,  with superimposed central disc herniation. The herniated disc contents  appear slightly less pronounced than the prior examination. There is  moderate facet arthropathy. There is moderate encroachment on the  lateral recesses with mild overall spinal stenosis. Mild bilateral  neural foraminal stenosis continues to be present.     L5-S1: No significant disc bulge or herniation. Moderate facet  arthropathy. Mild left neural foraminal stenosis. The right neural  foramen and spinal canal are patent. The findings are not  significantly changed.                                                                      IMPRESSION:  1. Findings of previous L4-L5 laminotomy for microdiscectomy.  2. Multilevel degenerative disc disease and facet arthropathy of the  lumbar spine overall appears similar to the prior examination. Please  see the body of the report for additional details and level-by-level  findings.     JERONIMO PAZ MD    Allergies:      Allergies   Allergen Reactions     No Known Drug Allergies         Vitals:  /76 (BP Location: Left arm, Patient Position: Sitting, Cuff Size: Adult Large)   Pulse 54   SpO2 99%     Review of Systems: The patient denies recent fever, chills, illness, use of antibiotics or anticoagulants. All other 10-point review of systems negative.     Procedure: The procedure and risks were explained, and informed written consent was obtained from the patient. Risks include but are not limited to: infection, bleeding, increased pain, and damage to soft tissue, nerve, muscle, and vasculature structures. After getting informed consent, patient was brought into the procedure suite and was placed in a prone  position on the procedure table. A Pause for the Cause was performed. Patient was prepped and draped in sterile fashion.     After identifying the right L5 neuroforamen, the C-arm was rotated to a right lateral oblique angle.  A total of 4.5 mL of Lidocaine 1% was used to anesthetize the skin and the needle track at a skin entry site coaxial with the fluoroscopy beam, and overriding the superior aspect of the neuroforamen.  A 22 gauge 5 inch spinal needle was advanced under intermittent fluoroscopy until it entered the foramen superiorly.    The position was then inspected from anteroposterior and lateral views, and the needle adjusted appropriately.  After negative aspiration, a total of 1 mL of Omnipaque-300 was injected using static and continuous fluoroscopy confirming appropriate position, with spread along the nerve root sheath and into the epidural space, with no intravascular or intrathecal uptake. 9 mL of Omnipaque-300 was wasted.    1mL of 1% lidocaine with 10 mg of dexamethasone was injected.  The needle was removed. Hemostasis was achieved, the area was cleaned, and bandaids were placed when appropriate. Images were saved to PACS.    The patient tolerated the procedure well, and was taken to the recovery room, and there was no evidence of procedural complications. No new sensory or motor deficits were noted following the procedure. The patient was stable and able to ambulate on discharge home. Post-procedure instructions were provided.     Pre-procedure pain score: 2/10 in the back, 2/10 in the leg  Post-procedure pain score: 1/10 in the back, 2/10 in the leg    Assessment/Plan: Taylor Dunlap is a 49 year old female s/p Right L5 transforaminal epidural steroid injection today for lumbar spondylosis, radiculitis/radiculopathy.     1. Following today's procedure, the patient was advised to contact the [facility] for any of the following:   Fever, chills, or night sweats   New onset of  pain, numbness, or weakness   Any questions/concerns regarding the procedure  If unable to contact the Pain Center, the patient was instructed to go to a local Emergency Room for any complications.   2. The patient will receive a follow-up call in 1 week.  3. The patient should follow-up with the referring provider in 2 weeks for post-procedure evaluation.        Joseph Rebollar DO  [facility]

## 2020-01-15 ENCOUNTER — HOSPITAL ENCOUNTER (OUTPATIENT)
Dept: MAMMOGRAPHY | Facility: CLINIC | Age: 50
Discharge: HOME | End: 2020-01-15
Attending: FAMILY MEDICINE | Admitting: FAMILY MEDICINE
Payer: COMMERCIAL

## 2020-01-15 DIAGNOSIS — Z12.31 ENCOUNTER FOR SCREENING MAMMOGRAM FOR BREAST CANCER: ICD-10-CM

## 2020-01-15 PROCEDURE — 77067 SCR MAMMO BI INCL CAD: CPT

## 2020-01-17 ENCOUNTER — OFFICE VISIT (OUTPATIENT)
Dept: PALLIATIVE MEDICINE | Facility: CLINIC | Age: 50
End: 2020-01-17
Payer: COMMERCIAL

## 2020-01-17 DIAGNOSIS — M79.661 PAIN OF RIGHT LOWER LEG: ICD-10-CM

## 2020-01-17 DIAGNOSIS — M54.16 LUMBAR RADICULOPATHY: Primary | ICD-10-CM

## 2020-01-17 DIAGNOSIS — G89.4 CHRONIC PAIN SYNDROME: ICD-10-CM

## 2020-01-17 PROCEDURE — 97112 NEUROMUSCULAR REEDUCATION: CPT | Mod: GP | Performed by: PHYSICAL THERAPIST

## 2020-01-17 PROCEDURE — 96159 HLTH BHV IVNTJ INDIV EA ADDL: CPT | Performed by: PSYCHOLOGIST

## 2020-01-17 PROCEDURE — 96158 HLTH BHV IVNTJ INDIV 1ST 30: CPT | Performed by: PSYCHOLOGIST

## 2020-01-17 NOTE — PROGRESS NOTES
[facility]   [facility] [place]  Behavioral Medicine Visit    Patient Name: Taylor Dunlap     YOB: 1970   Medical Record Number: [id number]  Date: 1/17/2020                SUBJECTIVE: Patient reports her pain is moderately improved.  She believes injection has been very helpful.  She reports however that her right knee has been particularly painful, specifically her muscles which she believes is directly related to compensating for her back pain.  She was advised to address this with PT today.  Patient reports her mood is mildly improved.  She is cautiously optimistic regarding her pain improvement, and acknowledges worry thoughts that the injection will wear off quickly as it has in the past.  Activity level is the same.  Discussed the importance of continuing to pace activity, and that she could work on slowly increasing activity to rebuild stamina.  Stress level is the same.  Sleep is mildly improved as her pain has improved.  Engages in self-care for her pain 2-3 times per day.  Discussed the window of tolerance, and discussed ways to increase mind body awareness.  Discussed negative self talk that might interfere with her pain management and may actually increase pain by increasing muscle tension (i.e., worry thoughts that she will fall when going downstairs).    OBJECTIVE: Patient seems to have a fairly good understanding of mind-body connection, including negative self talk.  She seems to actively utilize skills to manage her pain.    Length of Visit: 60 minutes      Pain Diagnoses per pain provider:   Lumbar radiculopathy       Pain of right lower leg      Chronic pain syndrome     Assessment: Current Emotional / Mental Status    Appearance:   Appropriate   Eye Contact:   Good   Psychomotor Behavior: Normal   Attitude:   Cooperative   Orientation:   All  Speech  Rate / Production:             Normal    Volume:              Normal   Mood:    Normal  Affect:    Appropriate  Bright   Thought Content:  Clear   Thought Form:  Coherent  Goal Directed  Logical   Insight:    Good     ASSESSMENT:   Progress toward goals: good.    Pain Status: improved    Emotional Status: improved              Medication / chemical use concerns: None    PLAN:   Next Appointment: Taylor Dunlap will schedule a follow-up appointment in 2 week(s).  Assignment: Continue to pace activity with plan to slowly increase activity to rebuild stamina.  She does utilize daily self-care for her pain.  Objectives / interventions for next session: Explore barriers to engagement in self-care, and a continued negative self talk that interferes with engagement in self-care or increases pain.    Edna Plaza PsyD LP  Outpatient Clinic Therapist  [facility]    Disclaimer: This note consists of symbols derived from keyboarding, dictation and/or voice recognition software. As a result, there may be errors in the script that have gone undetected. Please consider this when interpreting information found in this chart.

## 2020-01-17 NOTE — PROGRESS NOTES
"PAIN PHYSICAL THERAPY PROGRESS NOTE  Patient Name: Taylor Dunlap      YOB: 1970     Medical Record Number: [id number]  Diagnosis:    Lumbar radiculopathy  Pain of right lower leg  Chronic pain syndrome    Visit: 2/6  Chief Complaint: right leg pain    Per pain provider assessment:  She reports a 20 year history of chronic right leg pain prior to which she underwent a L4-5 discectomy/laminectomy (27 years ago) which helped significantly with chronic back pain. The right leg pain has been persistent over the years and slowly progressing. MRI shows a broad disc protrusion at L4-5 which may be contacting/irritating the traversing L5 nerve at this level causing her symptoms. Based on history and exam the different for her symptoms includes: L5 radiculopathy, sciatica caused by piriformis dysfunction, chronic myofascial pain.   Onset: jan 11, 1993  Location/Radiation: back, now right leg  Quality: \"constant dull pain with times of sharp pain\" \"constant\"  Severity/Intensity: 8/10 at worst, 2/10 at best, 4/10 on average  Fatigue level: 5/10 average  Aggravating factors include: \"standing too long - walking\" \"Sitting too long- cleaning my house)  Relieving factors include: \"laying down    Patient's goals for physical therapy: be able to cook a meal, have a walking program, improve mood and have more energy     Subjective: Patient reports low back is feeling much better since having injectio.  No shooting pain down right leg, but having tightness in muscles of right leg, anterolateral thigh/knee.  Starting to add walking in the store when doing errands; getting up from sitting at desk to walk around a few minutes or stretch while at work.    Self Care  HEP: issued handouts  Walking/Aerobic Activity: issued handout, begin 10' x 2 daily  Posture: next  Breathing/Relaxation: cuing to breathe with movement  Heat/Ice: next  Mini Breaks: issued handout  Pacing: issued handout    Objective Findings:  "   POSTURE:  Observation: Patient demonstrates forward head, protracted shoulders and thoracic kyphosis in standing and sitting posture.  GAIT, LOCOMOTION, and BALANCE:  Gait and Locomotion: mild antalgic gait with WB on right LE  Balance: next  RANGE OF MOTION: restricted right hip/knee flexion; left hip/knee WFL  MUSCLE PERFORMANCE:   Strength: demonstrates core instability  Flexibility: tightness noted in next  FUNCTIONAL TESTING/OBSERVATION: guarded and slow mobility with chair mechanics  Pain behaviors: None      HEP:   -SKTC, hamstring, piriformis (left side only for now)  -frog legs  -beginning rotation, global and differentiated patterns    Instruction in supine SKTC, hamstring and piriformis strap stretch on the left LE; unable to perform on the right due to painful and limited right knee flexion.   Instruction in supine frog leg hip mobility; progressed to supine rolling head/tiliting knee rotation.  Issued handouts.  Added instruction in chair body mechanics using legs and less with pushing up using arms  Frequent cuing to avoid holding breath due to anticipation of pain.  Noted improved mobility with less pain right knee at end of session.    Treatment Interventions:  Neuromuscular Reeducation:   For 45 minutes as above.  __________________________________________________________________    Assessment:  Ongoing Functional Limitations Include:  Patient tolerated/responded well to treatment    Intensity Level: 4 (1=low intensity; 5=high intensity)  Demonstrates/Verbalizes Technique: 4 (1= poor technique-difficulty performing exercises,significant cues required; 5= good technique-performs exercises without cues)  Body Awareness: 3 (1=low awareness; 5=high awareness)  Posture/Stability: 4 (1= poor posture, stability; 5= good posture, stability)  Motivational Level: Cooperative  Response to Teaching: cooperative  Factors that affect learning:  None    _______________________________________________________________________  Plan of Care  Continue PT to support reactivation and integration of self regulation pain management skills;  Continue with prescribed plan of care - progress as tolerated.  Focus next session will be on: monitor self cares, walking and HEP; progress HEP including right LE flexibility; core strengthening--squats, marching, bridging, clam shell     Present:  NA     Total Visit Time:  45 minutes    Therapist: Marisol Morejon, PT         Date: 1/17/2020

## 2020-01-20 ENCOUNTER — TELEPHONE (OUTPATIENT)
Dept: PALLIATIVE MEDICINE | Facility: CLINIC | Age: 50
End: 2020-01-20

## 2020-01-20 NOTE — TELEPHONE ENCOUNTER
Patient had a Right L5 transforaminal epidural steroid injection on 1/13/20.  Called patient for an update.      Pt reported the following details:  Injection has been very helpful. Pt said she is very happy with results.   Told patient that the information will be forwarded to her provider.  Also explained that, if a steroid medication was used, it could take up to 14 days to feel the full effect and if pt has any further questions or concerns pt should call the clinic at [phone].

## 2020-01-21 NOTE — PROGRESS NOTES
SUBJECTIVE:   CC: Taylor Dunlap is an 49 year old woman who presents for preventive health visit.     Healthy Habits:     Getting at least 3 servings of Calcium per day:  Yes    Bi-annual eye exam:  NO    Dental care twice a year:  Yes    Sleep apnea or symptoms of sleep apnea:  Excessive snoring    Diet:  Regular (no restrictions)    Frequency of exercise:  2-3 days/week    Duration of exercise:  15-30 minutes    Taking medications regularly:  Yes    Medication side effects:  None    PHQ-2 Total Score: 0    Additional concerns today:  No        Today's PHQ-2 Score:   PHQ-2 ( 1999 [employer]) 1/22/2020   Q1: Little interest or pleasure in doing things 0   Q2: Feeling down, depressed or hopeless 0   PHQ-2 Score 0   Q1: Little interest or pleasure in doing things Not at all   Q2: Feeling down, depressed or hopeless Not at all   PHQ-2 Score 0       Abuse: Current or Past(Physical, Sexual or Emotional)- No  Do you feel safe in your environment? Yes        Social History     Tobacco Use     Smoking status: Former Smoker     Packs/day: 0.50     Years: 10.00     Pack years: 5.00     Types: Cigarettes     Last attempt to quit: 4/1/2009     Years since quitting: 10.8     Smokeless tobacco: Never Used     Tobacco comment: quit 11/2018   Substance Use Topics     Alcohol use: Yes     Comment: occasional; couple glasses wine on weekends.         Alcohol Use 1/22/2020   Prescreen: >3 drinks/day or >7 drinks/week? No   Prescreen: >3 drinks/day or >7 drinks/week? -       Reviewed orders with patient.  Reviewed health maintenance and updated orders accordingly - Yes  Labs reviewed in EPIC    Mammogram Screening: Patient over age 50, mutual decision to screen reflected in health maintenance.    Pertinent mammograms are reviewed under the imaging tab.  History of abnormal Pap smear: NO - age 30- 65 PAP every 3 years recommended  PAP / HPV 7/10/2013 7/14/2009 12/15/2006   PAP ASC-US(A) NIL NIL     Reviewed and updated as needed this  visit by clinical staff  Tobacco  Allergies  Med Hx  Surg Hx  Fam Hx  Soc Hx        Reviewed and updated as needed this visit by Provider        Past Medical History:   Diagnosis Date     Back pain 1993    back surgery and back injections     Skin cancer, basal cell 2013       Past Surgical History:   Procedure Laterality Date     C NONSPECIFIC PROCEDURE  4/99    L4-5 laminectomy & microdiscectomy ([place])       MEDICATIONS:  Current Outpatient Medications   Medication     gabapentin (NEURONTIN) 300 MG capsule     Acetaminophen (TYLENOL PO)     Aspirin-Acetaminophen-Caffeine (EXCEDRIN PO)     No current facility-administered medications for this visit.        SOCIAL HISTORY:  Social History     Tobacco Use     Smoking status: Former Smoker     Packs/day: 0.50     Years: 10.00     Pack years: 5.00     Types: Cigarettes     Start date: 1/1/1987     Last attempt to quit: 4/1/2009     Years since quitting: 10.8     Smokeless tobacco: Never Used     Tobacco comment: quit 11/2018   Substance Use Topics     Alcohol use: Yes     Comment: occasional; couple glasses wine on weekends.       Family History   Problem Relation Age of Onset     Arthritis Brother      Cerebrovascular Disease Maternal Grandfather      C.A.D. Paternal Grandfather         MI; 50's     Arthritis Paternal Grandmother         RA     Brain Cancer Paternal Aunt         R.A     Arthritis Paternal Aunt          Review of Systems   Constitutional: Negative for chills and fever.   HENT: Negative for congestion, ear pain, hearing loss and sore throat.    Eyes: Negative for pain and visual disturbance.   Respiratory: Negative for cough and shortness of breath.    Cardiovascular: Negative for chest pain, palpitations and peripheral edema.   Gastrointestinal: Negative for abdominal pain, constipation, diarrhea, heartburn, hematochezia and nausea.   Breasts:  Negative for tenderness, breast mass and discharge.   Genitourinary: Negative for dysuria,  "frequency, genital sores, hematuria, pelvic pain, urgency, vaginal bleeding and vaginal discharge.   Musculoskeletal: Negative for arthralgias, joint swelling and myalgias.   Skin: Negative for rash.   Neurological: Negative for dizziness, weakness, headaches and paresthesias.   Psychiatric/Behavioral: Negative for mood changes. The patient is not nervous/anxious.           OBJECTIVE:   /68 (BP Location: Right arm, Patient Position: Chair, Cuff Size: Adult Large)   Pulse 70   Temp 98.1  F (36.7  C) (Oral)   Resp 16   Ht 1.753 m (5' 9\")   Wt 116.8 kg (257 lb 6.4 oz)   SpO2 99%   BMI 38.01 kg/m    Physical Exam  GENERAL APPEARANCE: healthy, alert and no distress  EYES: Eyes grossly normal to inspection, PERRL and conjunctivae and sclerae normal  HENT: ear canals and TM's normal, nose and mouth without ulcers or lesions, oropharynx clear and oral mucous membranes moist  NECK: no adenopathy, no asymmetry, masses, or scars and thyroid normal to palpation  RESP: lungs clear to auscultation - no rales, rhonchi or wheezes  BREAST: normal without masses, tenderness or nipple discharge and no palpable axillary masses or adenopathy  CV: regular rate and rhythm, normal S1 S2, no S3 or S4, no murmur, click or rub, no peripheral edema and peripheral pulses strong  ABDOMEN: soft, nontender, no hepatosplenomegaly, no masses and bowel sounds normal   (female): normal female external genitalia, normal urethral meatus, vaginal mucosal atrophy noted, normal cervix, adnexae, and uterus without masses or abnormal discharge  MS: no musculoskeletal defects are noted and gait is age appropriate without ataxia  SKIN: no suspicious lesions or rashes  NEURO: Normal strength and tone, sensory exam grossly normal, mentation intact and speech normal  PSYCH: mentation appears normal and affect normal/bright    Diagnostic Test Results:  Labs reviewed in Epic    ASSESSMENT/PLAN:   1. Screening for malignant neoplasm of cervix    - Pap " "imaged thin layer screen with HPV - recommended age 30 - 65 years (select HPV order below)  - HPV High Risk Types DNA Cervical    2. Routine general medical examination at a health care facility    - REVIEW OF HEALTH MAINTENANCE PROTOCOL ORDERS  - Lipid panel reflex to direct LDL Fasting  - GASTROENTEROLOGY ADULT REF PROCEDURE ONLY Yesenia Dickey [phone]; No Provider Preference    3. Family history of diabetes mellitus    - Hemoglobin A1c    4. Lumbar radiculopathy  stable  - gabapentin (NEURONTIN) 300 MG capsule; Take 1 capsule (300 mg) by mouth 3 times daily  Dispense: 270 capsule; Refill: 3    COUNSELING:  Reviewed preventive health counseling, as reflected in patient instructions  Special attention given to:        Regular exercise       Healthy diet/nutrition       Colon cancer screening    Estimated body mass index is 37.83 kg/m  as calculated from the following:    Height as of 12/4/19: 1.759 m (5' 9.25\").    Weight as of 12/4/19: 117 kg (258 lb).    Weight management plan: Discussed healthy diet and exercise guidelines     reports that she quit smoking about 10 years ago. Her smoking use included cigarettes. She has a 5.00 pack-year smoking history. She has never used smokeless tobacco.     Recheck in one year      Counseling Resources:  ATP IV Guidelines  Pooled Cohorts Equation Calculator  Breast Cancer Risk Calculator  FRAX Risk Assessment  ICSI Preventive Guidelines  Dietary Guidelines for Americans, 2010  USDA's MyPlate  ASA Prophylaxis  Lung CA Screening    Shereen Brooks MD  [facility]-Visit Planning     Future Appointments   Date Time Provider Department Center   1/22/2020  2:15 PM Shereen Brooks MD [facility]   1/24/2020  8:00 AM Marisol Morejon, PT [facility]   1/31/2020  8:00 AM Marisol Morejon, PT [facility]   1/31/2020  9:00 AM Edna Plaza [facility]   2/20/2020  4:00 PM Joseph Rebollar MD [facility] " "    Arrival Time for this Appointment:  1:55 PM   Appointment Notes for this encounter:   PAP, phy with pap    Questionnaires Reviewed/Assigned  No additional questionnaires are needed      Patient preferred phone number: [phone]    Spoke to patient via phone. Patient does not have additional questions or concerns.        Visit is preventive. Reviewed purpose of preventive visit with patient.    Health Maintenance Due   Topic Date Due     ANNUAL REVIEW OF HM ORDERS  1970     COLONOSCOPY  05/31/1979     HIV SCREENING  05/31/1984     PREVENTIVE CARE VISIT  07/10/2014     HPV TEST  07/10/2018     LIPID  07/10/2018     PAP  07/10/2018     PHQ-2  01/01/2020     ZOSTER IMMUNIZATION (1 of 2) 05/31/2019     Patient is due for:  pap  Appointment scheduled.    [employer]  Patient is active on [employer].    Questionnaire Review   Advised patient to arrive early in order to complete questionnaires.    Call Summary  \"Thank you for your time today.  If anything comes up before your appointment, please feel free to contact us at [phone].\"    "

## 2020-01-22 ENCOUNTER — OFFICE VISIT (OUTPATIENT)
Dept: FAMILY MEDICINE | Facility: CLINIC | Age: 50
End: 2020-01-22
Payer: COMMERCIAL

## 2020-01-22 VITALS
SYSTOLIC BLOOD PRESSURE: 120 MMHG | OXYGEN SATURATION: 99 % | RESPIRATION RATE: 16 BRPM | HEART RATE: 70 BPM | HEIGHT: 69 IN | BODY MASS INDEX: 38.12 KG/M2 | DIASTOLIC BLOOD PRESSURE: 68 MMHG | WEIGHT: 257.4 LBS | TEMPERATURE: 98.1 F

## 2020-01-22 DIAGNOSIS — Z12.4 SCREENING FOR MALIGNANT NEOPLASM OF CERVIX: Primary | ICD-10-CM

## 2020-01-22 DIAGNOSIS — M54.16 LUMBAR RADICULOPATHY: ICD-10-CM

## 2020-01-22 DIAGNOSIS — Z83.3 FAMILY HISTORY OF DIABETES MELLITUS: ICD-10-CM

## 2020-01-22 DIAGNOSIS — Z00.00 ROUTINE GENERAL MEDICAL EXAMINATION AT A HEALTH CARE FACILITY: ICD-10-CM

## 2020-01-22 LAB — HBA1C MFR BLD: 5.4 % (ref 0–5.6)

## 2020-01-22 PROCEDURE — 99396 PREV VISIT EST AGE 40-64: CPT | Performed by: FAMILY MEDICINE

## 2020-01-22 PROCEDURE — 87624 HPV HI-RISK TYP POOLED RSLT: CPT | Performed by: FAMILY MEDICINE

## 2020-01-22 PROCEDURE — 80061 LIPID PANEL: CPT | Performed by: FAMILY MEDICINE

## 2020-01-22 PROCEDURE — G0145 SCR C/V CYTO,THINLAYER,RESCR: HCPCS | Performed by: FAMILY MEDICINE

## 2020-01-22 PROCEDURE — 83036 HEMOGLOBIN GLYCOSYLATED A1C: CPT | Performed by: FAMILY MEDICINE

## 2020-01-22 PROCEDURE — 36415 COLL VENOUS BLD VENIPUNCTURE: CPT | Performed by: FAMILY MEDICINE

## 2020-01-22 RX ORDER — GABAPENTIN 300 MG/1
300 CAPSULE ORAL 3 TIMES DAILY
Qty: 270 CAPSULE | Refills: 3 | Status: SHIPPED | OUTPATIENT
Start: 2020-01-22 | End: 2021-02-12

## 2020-01-22 ASSESSMENT — ENCOUNTER SYMPTOMS
ABDOMINAL PAIN: 0
NERVOUS/ANXIOUS: 0
SORE THROAT: 0
HEMATURIA: 0
HEMATOCHEZIA: 0
DIZZINESS: 0
SHORTNESS OF BREATH: 0
BREAST MASS: 0
PALPITATIONS: 0
NAUSEA: 0
EYE PAIN: 0
PARESTHESIAS: 0
JOINT SWELLING: 0
DIARRHEA: 0
CHILLS: 0
CONSTIPATION: 0
FREQUENCY: 0
FEVER: 0
HEARTBURN: 0
DYSURIA: 0
COUGH: 0
ARTHRALGIAS: 0
MYALGIAS: 0
WEAKNESS: 0
HEADACHES: 0

## 2020-01-22 ASSESSMENT — MIFFLIN-ST. JEOR: SCORE: 1851.94

## 2020-01-22 NOTE — PATIENT INSTRUCTIONS
Preventive Health Recommendations  Female Ages 50 - 64    Yearly exam: See your health care provider every year in order to  o Review health changes.   o Discuss preventive care.    o Review your medicines if your doctor has prescribed any.      Get a Pap test every three years (unless you have an abnormal result and your provider advises testing more often).    If you get Pap tests with HPV test, you only need to test every 5 years, unless you have an abnormal result.     You do not need a Pap test if your uterus was removed (hysterectomy) and you have not had cancer.    You should be tested each year for STDs (sexually transmitted diseases) if you're at risk.     Have a mammogram every 1 to 2 years.    Have a colonoscopy at age 50, or have a yearly FIT test (stool test). These exams screen for colon cancer.      Have a cholesterol test every 5 years, or more often if advised.    Have a diabetes test (fasting glucose) every three years. If you are at risk for diabetes, you should have this test more often.     If you are at risk for osteoporosis (brittle bone disease), think about having a bone density scan (DEXA).    Shots: Get a flu shot each year. Get a tetanus shot every 10 years.    Nutrition:     Eat at least 5 servings of fruits and vegetables each day.    Eat whole-grain bread, whole-wheat pasta and brown rice instead of white grains and rice.    Get adequate Calcium and Vitamin D.     Lifestyle    Exercise at least 150 minutes a week (30 minutes a day, 5 days a week). This will help you control your weight and prevent disease.    Limit alcohol to one drink per day.    No smoking.     Wear sunscreen to prevent skin cancer.     See your dentist every six months for an exam and cleaning.    See your eye doctor every 1 to 2 years.      Patient Education     Cardiac Rehabilitation  Living your best life  If you have had a heart attack, valve or bypass surgery, stent placement or other heart problem, cardiac  rehabilitation can help you return safely and more quickly to your normal life.  Our team includes doctors, exercise specialists, dietitians, pharmacists, chaplains/psychologists and social workers. We will help you create your plan for a heart-healthy lifestyle. It may include:    Quitting smoking    Regular exercise    Losing weight    Eating a healthy diet    Other lifestyle changes to improve health.  Team members will work with you to help you reach your goals.  Three phases of rehab  There are three phases: in hospital, therapy after the hospital and an exercise program (Wellness and Exercise for Life).  In the hospital  We will work with you to make you stronger and slowly increase your activity. You'll learn about risk factors for your heart, and we will monitor your heart to be sure you are ready to leave the hospital.  Exercise therapy  This phase starts soon after you leave the hospital. Your heart rhythm, blood pressure and heart rate are closely tracked in this exercise program. This will make sure you are safe while you are active.  The program is tailored to meet your personal goals. We will help you improve your heart and lung function, strengthen your muscles, and succeed in making lifestyle changes.  Exercise sessions will also help you return safely to your daily activities and work. You will have a chance to meet one-on-one with an exercise specialist to review the changes you are making and to measure your progress. You and your family may also attend classes on exercise, nutrition, medicines, blood pressure, artery disease and stress management.  WEL (Wellness and Exercise for Life)  This ongoing exercise program provides a fun, inspiring setting for you to exercise with others who have similar goals. There is a fee. Our specialists help you increase your exercise workloads safely. They can help you learn how to have an active lifestyle and gain confidence to exercise on your own. This program  includes supervised aerobic activity and exercises to make you stronger and more limber.  Getting started  You will need a referral from your doctor. We will work with you and your doctor to design a program that is right for you. Most health insurance plans will cover rehab programs, but it is best to confirm coverage with your insurer.  Locations  To make your first appointment, call [phone] or [phone]. In [place] and [place], call [phone].  [place]  [facility]  [address], [address]  [place] [address]  [facility]  [address]  [place] [address]  [facility]  [address]  [place] [address]  [address] [address]  [facility]  [address] [place] [address]  [facility]  [address] [place] [address]  Visit [email or web address] for more information.  For informational purposes only. Not to replace the advice of your health care provider.  Copyright   2015 [employer]. All rights reserved. [employer] 575592 - REV 02/16.  For informational purposes only. Not to replace the advice of your health care provider.  Copyright   2018 [employer]. All rights reserved.

## 2020-01-23 LAB
CHOLEST SERPL-MCNC: 220 MG/DL
HDLC SERPL-MCNC: 69 MG/DL
LDLC SERPL CALC-MCNC: 115 MG/DL
NONHDLC SERPL-MCNC: 151 MG/DL
TRIGL SERPL-MCNC: 181 MG/DL

## 2020-01-24 ENCOUNTER — OFFICE VISIT (OUTPATIENT)
Dept: PALLIATIVE MEDICINE | Facility: CLINIC | Age: 50
End: 2020-01-24
Payer: COMMERCIAL

## 2020-01-24 DIAGNOSIS — M79.661 PAIN OF RIGHT LOWER LEG: ICD-10-CM

## 2020-01-24 DIAGNOSIS — M54.16 LUMBAR RADICULOPATHY: Primary | ICD-10-CM

## 2020-01-24 DIAGNOSIS — G89.4 CHRONIC PAIN SYNDROME: ICD-10-CM

## 2020-01-24 PROCEDURE — 97112 NEUROMUSCULAR REEDUCATION: CPT | Mod: GP | Performed by: PHYSICAL THERAPIST

## 2020-01-24 NOTE — PROGRESS NOTES
"PAIN PHYSICAL THERAPY PROGRESS NOTE  Patient Name: Taylor Dunlap      YOB: 1970     Medical Record Number: [id number]  Diagnosis:    Lumbar radiculopathy  Pain of right lower leg  Chronic pain syndrome    Visit: 3/6  Chief Complaint: right leg pain    Per pain provider assessment:  She reports a 20 year history of chronic right leg pain prior to which she underwent a L4-5 discectomy/laminectomy (27 years ago) which helped significantly with chronic back pain. The right leg pain has been persistent over the years and slowly progressing. MRI shows a broad disc protrusion at L4-5 which may be contacting/irritating the traversing L5 nerve at this level causing her symptoms. Based on history and exam the different for her symptoms includes: L5 radiculopathy, sciatica caused by piriformis dysfunction, chronic myofascial pain.   Onset: jan 11, 1993  Location/Radiation: back, now right leg  Quality: \"constant dull pain with times of sharp pain\" \"constant\"  Severity/Intensity: 8/10 at worst, 2/10 at best, 4/10 on average  Fatigue level: 5/10 average  Aggravating factors include: \"standing too long - walking\" \"Sitting too long- cleaning my house)  Relieving factors include: \"laying down    Patient's goals for physical therapy: be able to cook a meal, have a walking program, improve mood and have more energy     Subjective: Patient reports low back is feeling much better since having injectio.  No shooting pain down right leg, but having tightness in muscles of right leg, anterolateral thigh/knee.  Starting to add walking in the store when doing errands; getting up from sitting at desk to walk around a few minutes or stretch while at work.    Self Care  HEP: issued handouts  Walking/Aerobic Activity: issued handout, begin 10' x 2 daily  Posture: next  Breathing/Relaxation: cuing to breathe with movement  Mini Breaks: indep  Pacing: indep    Objective Findings:    POSTURE:  Observation: Patient demonstrates " forward head, protracted shoulders and thoracic kyphosis in standing and sitting posture.  GAIT, LOCOMOTION, and BALANCE:  Gait and Locomotion: mild antalgic gait with WB on right LE  Balance: next  RANGE OF MOTION: restricted right hip/knee flexion; left hip/knee WFL  MUSCLE PERFORMANCE:   Strength: demonstrates core instability  Flexibility: tightness noted in next  FUNCTIONAL TESTING/OBSERVATION: guarded and slow mobility with chair mechanics  Pain behaviors: None      HEP:   -SKTC, hamstring, piriformis (left side only for now)  -frog legs  -beginning rotation, global and differentiated patterns    Unable to perform LE stretches on the right due to painful and limited right knee flexion; mild warmth anterior right knee   Reinforced chair body mechanics using legs and less pushing up using arms  Performed TM x 5' at 2.0 mph  Instructed in SL bilateral LE kick out nerve glides.  Performed gentle manual sciatic nerve release at sciatic notch and posterior right knee.  Noted tenderness at right SIJ and anterior innominate rotation      Treatment Interventions:  Neuromuscular Reeducation:   For 45 minutes as above.  __________________________________________________________________    Assessment:  Ongoing Functional Limitations Include:  Patient tolerated/responded well to treatment    Intensity Level: 4 (1=low intensity; 5=high intensity)  Demonstrates/Verbalizes Technique: 4 (1= poor technique-difficulty performing exercises,significant cues required; 5= good technique-performs exercises without cues)  Body Awareness: 3 (1=low awareness; 5=high awareness)  Posture/Stability: 4 (1= poor posture, stability; 5= good posture, stability)  Motivational Level: Cooperative  Response to Teaching: cooperative  Factors that affect learning: None    _______________________________________________________________________  Plan of Care  Continue PT to support reactivation and integration of self regulation pain management  skills;  Continue with prescribed plan of care - progress as tolerated.  Focus next session will be on: monitor self cares, walking and HEP; progress HEP including calf stretch; core strengthening--squats, marching, bridging, clam shell; consider seated strap LE stretch     Present:  NA     Total Visit Time:  45 minutes    Therapist: Marisol Morejon, PT         Date: 1/24/2020

## 2020-01-27 LAB
COPATH REPORT: NORMAL
PAP: NORMAL

## 2020-01-29 LAB
FINAL DIAGNOSIS: NORMAL
HPV HR 12 DNA CVX QL NAA+PROBE: NEGATIVE
HPV16 DNA SPEC QL NAA+PROBE: NEGATIVE
HPV18 DNA SPEC QL NAA+PROBE: NEGATIVE
SPECIMEN DESCRIPTION: NORMAL
SPECIMEN SOURCE CVX/VAG CYTO: NORMAL

## 2020-01-31 ENCOUNTER — OFFICE VISIT (OUTPATIENT)
Dept: PALLIATIVE MEDICINE | Facility: CLINIC | Age: 50
End: 2020-01-31
Payer: COMMERCIAL

## 2020-01-31 DIAGNOSIS — G89.4 CHRONIC PAIN SYNDROME: ICD-10-CM

## 2020-01-31 DIAGNOSIS — M54.16 LUMBAR RADICULOPATHY: Primary | ICD-10-CM

## 2020-01-31 DIAGNOSIS — M79.661 PAIN OF RIGHT LOWER LEG: ICD-10-CM

## 2020-01-31 PROCEDURE — 96159 HLTH BHV IVNTJ INDIV EA ADDL: CPT | Performed by: PSYCHOLOGIST

## 2020-01-31 PROCEDURE — 97112 NEUROMUSCULAR REEDUCATION: CPT | Mod: GP | Performed by: PHYSICAL THERAPIST

## 2020-01-31 PROCEDURE — 96158 HLTH BHV IVNTJ INDIV 1ST 30: CPT | Performed by: PSYCHOLOGIST

## 2020-01-31 NOTE — PROGRESS NOTES
"PAIN PHYSICAL THERAPY PROGRESS NOTE  Patient Name: Taylor Dunlap      YOB: 1970     Medical Record Number: [id number]  Diagnosis:    Lumbar radiculopathy  Pain of right lower leg  Chronic pain syndrome    Visit: 4/6  Chief Complaint: right leg pain    Per pain provider assessment:  She reports a 20 year history of chronic right leg pain prior to which she underwent a L4-5 discectomy/laminectomy (27 years ago) which helped significantly with chronic back pain. The right leg pain has been persistent over the years and slowly progressing. MRI shows a broad disc protrusion at L4-5 which may be contacting/irritating the traversing L5 nerve at this level causing her symptoms. Based on history and exam the different for her symptoms includes: L5 radiculopathy, sciatica caused by piriformis dysfunction, chronic myofascial pain.   Onset: jan 11, 1993  Location/Radiation: back, now right leg  Quality: \"constant dull pain with times of sharp pain\" \"constant\"  Severity/Intensity: 8/10 at worst, 2/10 at best, 4/10 on average  Fatigue level: 5/10 average  Aggravating factors include: \"standing too long - walking\" \"Sitting too long- cleaning my house)  Relieving factors include: \"laying down    Patient's goals for physical therapy: be able to cook a meal, have a walking program, improve mood and have more energy     Subjective: Last: Patient reports low back is feeling much better since having injectio.  No shooting pain down right leg, but having tightness in muscles of right leg, anterolateral thigh/knee.  Starting to add walking in the store when doing errands; getting up from sitting at desk to walk around a few minutes or stretch while at work.  Today: Pt reports \"things are going good.\"  Was able to utilize pacing skills while participating in housework over the weekend. Notes increased tailbone pain with prolonged sitting.  Pt states that although she feels better with exercising, her past experience has been " re-injury to her back with HEP.  She feels safer with walking for now.  Encouraged continued walking.    Self Care  HEP: issued handouts  Walking/Aerobic Activity: issued handout, begin 10' x 2 daily  Posture: next  Breathing/Relaxation: cuing to breathe with movement  Mini Breaks: indep  Pacing: indep    Objective Findings:    POSTURE:  Observation: Patient demonstrates forward head, protracted shoulders and thoracic kyphosis in standing and sitting posture.  GAIT, LOCOMOTION, and BALANCE:  Gait and Locomotion: mild antalgic gait with WB on right LE  Balance: next  RANGE OF MOTION: restricted right hip/knee flexion; left hip/knee WFL  MUSCLE PERFORMANCE:   Strength: demonstrates core instability  Flexibility: tightness noted in next  FUNCTIONAL TESTING/OBSERVATION: guarded and slow mobility with chair mechanics  Pain behaviors: None      HEP:   -SKTC, hamstring, piriformis (left side only for now)  -frog legs  -beginning rotation, global and differentiated patterns    Last: Unable to perform LE stretches on the right due to painful and limited right knee flexion; mild warmth anterior right knee   Reinforced chair body mechanics using legs and less pushing up using arms  Performed TM x 5' at 2.0 mph  Instructed in SL bilateral LE kick out nerve glides.  Performed gentle manual sciatic nerve release at sciatic notch and posterior right knee.  Noted tenderness at right SIJ and anterior innominate rotation    Today:  Noted L-4 FRS right and L-5 ERS left with associated protective right lumbar psps ms shortening   Noted tendency to sit with LE adduction and poor TA/pelvic floor postural support; also noted left pelvic rotation with anterior tilt due to right knee tightness  Pt reports protective muscle tightness in right low back produces tightness along right lateral knee (L-4 dermatome) especially with sitting.    Instruction in gentle Kegel's to activate core stability along with expanded abdominal breathing in  sitting and during transitional movements.  Instructed in kinesthetic differentiation of segmental thoracic and lumbar spine on the right in SL position.  Beginning instruction in SL right hip glides; noted difficulty with motor sequence involving overuse of right lumbar paraspinals, lateral quads and inhibition of pelvic floor.  Pt to focus on sitting posture with activation of pelvic floor, neutral pelvis and abdominal breathing.    Treatment Interventions:  Neuromuscular Reeducation:   For 45 minutes as above.  __________________________________________________________________    Assessment:  Ongoing Functional Limitations Include:  Patient tolerated/responded well to treatment    Intensity Level: 4 (1=low intensity; 5=high intensity)  Demonstrates/Verbalizes Technique: 4 (1= poor technique-difficulty performing exercises,significant cues required; 5= good technique-performs exercises without cues)  Body Awareness: 3 (1=low awareness; 5=high awareness)  Posture/Stability: 4 (1= poor posture, stability; 5= good posture, stability)  Motivational Level: Cooperative  Response to Teaching: cooperative  Factors that affect learning: None    _______________________________________________________________________  Plan of Care  Continue PT to support reactivation and integration of self regulation pain management skills;  Continue with prescribed plan of care - progress as tolerated.  Focus next session will be on: monitor self cares, walking and HEP; add seated strap LE stretch prior to walking; progress SL right shoulder/hip glides; will add HEP including calf stretch; core strengthening--squats, marching, bridging, clam shell as able   Present:  NA     Total Visit Time:  45 minutes    Therapist: Marisol Morejon, PT         Date: 1/31/2020

## 2020-01-31 NOTE — PROGRESS NOTES
[facility]   [facility] [place]  Behavioral Medicine Visit    Patient Name: Taylor Dunlap     YOB: 1970   Medical Record Number: [id number]  Date: 1/31/2020                SUBJECTIVE: Patient reports her pain is greatly improved.  She believes the injections have continued to provide consistent relief.  She does report having some worry thoughts about the wearing off, as the previous injection she had a few years ago did wear off after about 3 weeks.  She has had to catch herself in terms of overdoing activity, and reports that she is working hard on keeping this in mind and pacing activity.  Reports her mood is moderately improved as her pain is improved.  Her activity level is mildly increased, she continues to work on pacing.  Stress level is the same.  Sleep is been mildly improved.  However, she reports that she has a hard time shutting her brain down at night.  Spent much of the session working on sleep hygiene tips, and discussing how lack of sleep cumulatively can have a negative impact on her pain.  Engages in self-care for her pain 2-3 times per day.  Discussed that patient may do a check-in in about 3 to 4 weeks, and if at that time she continues to be on an upward trend with her pain and her mood, that we could move appointments to an as-needed basis.    OBJECTIVE: Patient readily engages, utilizes feedback and incorporate strategies into her management of her pain.    Length of Visit: 60 minutes     Pain Diagnoses per pain provider:   Lumbar radiculopathy, pain of right lower leg, chronic pain syndrome     Assessment: Current Emotional / Mental Status    Appearance:   Appropriate   Eye Contact:   Good   Psychomotor Behavior: Normal   Attitude:   Cooperative   Orientation:   All  Speech  Rate / Production:             Normal   Volume:              Normal   Mood:    Normal  Affect:    Appropriate  Bright    Thought Content:  Clear   Thought Form:  Coherent  Goal Directed  Logical   Insight:    Good     ASSESSMENT:   Progress toward goals: good.    Pain Status: improved    Emotional Status: improved              Medication / chemical use concerns: None    PLAN:   Next Appointment: Taylor Dunlap will schedule a follow-up appointment in 3-4 week(s).  Assignment: Try some of the sleep hygiene strategies as discussed.  Objectives / interventions for next session: Check in on sleep hygiene, explore other self soothing skills if needed to manage pain if pain returns.    Edna Plaza PsyD LP  Outpatient Clinic Therapist  [facility]    Disclaimer: This note consists of symbols derived from keyboarding, dictation and/or voice recognition software. As a result, there may be errors in the script that have gone undetected. Please consider this when interpreting information found in this chart.

## 2020-02-14 ENCOUNTER — OFFICE VISIT (OUTPATIENT)
Dept: PALLIATIVE MEDICINE | Facility: CLINIC | Age: 50
End: 2020-02-14
Payer: COMMERCIAL

## 2020-02-14 DIAGNOSIS — M79.661 PAIN OF RIGHT LOWER LEG: ICD-10-CM

## 2020-02-14 DIAGNOSIS — M54.16 LUMBAR RADICULOPATHY: Primary | ICD-10-CM

## 2020-02-14 DIAGNOSIS — G89.4 CHRONIC PAIN SYNDROME: ICD-10-CM

## 2020-02-14 PROCEDURE — 97112 NEUROMUSCULAR REEDUCATION: CPT | Mod: GP | Performed by: PHYSICAL THERAPIST

## 2020-02-14 NOTE — PROGRESS NOTES
"PAIN PHYSICAL THERAPY PROGRESS NOTE  Patient Name: Taylor Dunlap      YOB: 1970     Medical Record Number: [id number]  Diagnosis:    Lumbar radiculopathy  Pain of right lower leg  Chronic pain syndrome    Visit: 5/6  Chief Complaint: right leg pain    Per pain provider assessment:  She reports a 20 year history of chronic right leg pain prior to which she underwent a L4-5 discectomy/laminectomy (27 years ago) which helped significantly with chronic back pain. The right leg pain has been persistent over the years and slowly progressing. MRI shows a broad disc protrusion at L4-5 which may be contacting/irritating the traversing L5 nerve at this level causing her symptoms. Based on history and exam the different for her symptoms includes: L5 radiculopathy, sciatica caused by piriformis dysfunction, chronic myofascial pain.   Onset: jan 11, 1993  Location/Radiation: back, now right leg  Quality: \"constant dull pain with times of sharp pain\" \"constant\"  Severity/Intensity: 8/10 at worst, 2/10 at best, 4/10 on average  Fatigue level: 5/10 average  Aggravating factors include: \"standing too long - walking\" \"Sitting too long- cleaning my house)  Relieving factors include: \"laying down    Patient's goals for physical therapy: be able to cook a meal, have a walking program, improve mood and have more energy     Subjective: I'm doing good. Able to walk around the house and doing housework for a couple hours. Able to cook a meal without increased pain.  Still having to focus on not sitting more than an hour at work. Feeling stiff and painful when getting up from sitting.    Last: Patient reports low back is feeling much better since having injectio.  No shooting pain down right leg, but having tightness in muscles of right leg, anterolateral thigh/knee.  Starting to add walking in the store when doing errands; getting up from sitting at desk to walk around a few minutes or stretch while at work.  Today: Pt " "reports \"things are going good.\"  Was able to utilize pacing skills while participating in housework over the weekend. Notes increased tailbone pain with prolonged sitting.  Pt states that although she feels better with exercising, her past experience has been re-injury to her back with HEP.  She feels safer with walking for now.  Encouraged continued walking.    Self Care  HEP: issued handouts  Walking/Aerobic Activity: issued handout, begin 10' x 2 daily; add 1 minute to first period of walking  Posture: next  Breathing/Relaxation: cuing to breathe with movement  Mini Breaks: indep  Pacing: indep    Objective Findings:    POSTURE:  Observation: Patient demonstrates forward head, protracted shoulders and thoracic kyphosis in standing and sitting posture.  GAIT, LOCOMOTION, and BALANCE:  Gait and Locomotion: mild antalgic gait with WB on right LE  Balance: next  RANGE OF MOTION: restricted right hip/knee flexion; left hip/knee WFL  MUSCLE PERFORMANCE:   Strength: demonstrates core instability  Flexibility: tightness noted in next  FUNCTIONAL TESTING/OBSERVATION: guarded and slow mobility with chair mechanics  Pain behaviors: None      HEP:   -SKTC, hamstring, piriformis (left side only for now)  -frog legs  -beginning rotation, global and differentiated patterns  -SL LE dural glides  -Kegel's    Last:  Noted L-4 FRS right and L-5 ERS left with associated protective right lumbar psps ms shortening   Noted tendency to sit with LE adduction and poor TA/pelvic floor postural support; also noted left pelvic rotation with anterior tilt due to right knee tightness  Pt reports protective muscle tightness in right low back produces tightness along right lateral knee (L-4 dermatome) especially with sitting.  Instruction in gentle Kegel's to activate core stability along with expanded abdominal breathing in sitting and during transitional movements.  Instructed in kinesthetic differentiation of segmental thoracic and lumbar " spine on the right in SL position.  Today: Instruction in seated neutral pelvis and pelvic rocks in a-p and lateral tilt with trunk SB; added cuing with tailbone  Trial of seated rotation not tolerated well due to right LB restricted mobility  Performed TM x 11' at end of session      Treatment Interventions:  Neuromuscular Reeducation:   For 45 minutes as above.  __________________________________________________________________    Assessment:  Ongoing Functional Limitations Include:  Patient tolerated/responded well to treatment    Intensity Level: 4 (1=low intensity; 5=high intensity)  Demonstrates/Verbalizes Technique: 4 (1= poor technique-difficulty performing exercises,significant cues required; 5= good technique-performs exercises without cues)  Body Awareness: 3 (1=low awareness; 5=high awareness)  Posture/Stability: 4 (1= poor posture, stability; 5= good posture, stability)  Motivational Level: Cooperative  Response to Teaching: cooperative  Factors that affect learning: None    _______________________________________________________________________  Plan of Care  Continue PT to support reactivation and integration of self regulation pain management skills;  Continue with prescribed plan of care - progress as tolerated.  Focus next session will be on: monitor self cares, walking and HEP (on the right); add seated strap LE stretch prior to walking; progress SL right shoulder/hip glides; will add HEP including calf stretch; core strengthening--squats, marching, bridging, clam shell as able   Present:  NA     Total Visit Time:  45 minutes    Therapist: Marisol Morejon, PT         Date: 2/14/2020

## 2020-02-20 ENCOUNTER — OFFICE VISIT (OUTPATIENT)
Dept: PALLIATIVE MEDICINE | Facility: CLINIC | Age: 50
End: 2020-02-20
Payer: COMMERCIAL

## 2020-02-20 VITALS
SYSTOLIC BLOOD PRESSURE: 124 MMHG | OXYGEN SATURATION: 97 % | DIASTOLIC BLOOD PRESSURE: 78 MMHG | HEIGHT: 69 IN | WEIGHT: 256 LBS | BODY MASS INDEX: 37.92 KG/M2 | HEART RATE: 62 BPM

## 2020-02-20 DIAGNOSIS — M54.16 LUMBAR RADICULOPATHY: Primary | ICD-10-CM

## 2020-02-20 PROCEDURE — 99213 OFFICE O/P EST LOW 20 MIN: CPT | Performed by: PHYSICAL MEDICINE & REHABILITATION

## 2020-02-20 ASSESSMENT — MIFFLIN-ST. JEOR: SCORE: 1845.59

## 2020-02-20 NOTE — PROGRESS NOTES
[facility]    Date of visit: 2/20/2020    Chief complaint:   Chief Complaint   Patient presents with     Pain Management       Interval history:  Taylor Dunlap is a 49 year old female last seen by me on 1/2/2020.      Recommendations/plan at the last visit included:  1. Physical Therapy: Referred to chronic pain PT  2. Clinical Health Pain Psychologist: Referred to pain phd   1. Therapy can help reduce physical and psychosocial triggers or reinforcers of pain by adapting thoughts, feelings and behaviors to reduce symptoms and increase quality of life.  Evidence indicates that the practice of relaxation, meditation, and mindfulness techniques can significantly affect pain levels and overall well being.  3. Self Care Recommendations: Recommended a gradual gentle progressive exercise that does not increase pain - gradually increase daily walking program.  Take mini breaks - 5 minutes of mindfullness a couple times a day.   4. Diagnostic Studies: None, lumbar MRI reviewed with patient  5. Medication Management:   1. Start gabapentin 300mg HS and increase in 300mg increments every 3-4 days until taking 300mg TID.  6. Further procedures recommended: L5-S1 taylor ordered  7. Follow up: 1 month after epidural in clinic       Since her last visit, Taylor Dunlap reports:  -She had a L5-S1 taylor on 1/13/2020 with significant improvement in her right lower leg pain and the pain around the knee. She has no longer needed to take excederin regularly like she was before.  -She has been working with [employer] and [employer] as well with improvement noted. Feels that she is less tight in her back and her posture has improved.  -She worked with [employer] but felt she wasn't benefiting from this any more so she is just working with [employer] now.  -She takes gabapentin 600mg three times daily.    Pain scores:  Pain intensity on average is 3 on a scale of 0-10.     Current pain treatments:  "  -Gabapentin 600mg TID      Pain Treatments:  1. Medications:       Previous pain medications:  -oxycodone helpful in the past, never took chronically  -Ibuprofen - helpful  -Celebrex - didn't help  2. Physical Therapy: helpful in  [facility]                TENS unit: hasn't tried  3. Pain psychology: hasn't tried  4. Surgery: laminectomy/discectomy /, helped with back pain  5. Injections: epidural in 2017 didn't help leg pain  6. Alternative Therapies:               Chiropractic: helpful               Acupuncture: hasn't tried  Side Effects: no side effect    Medications:  Current Outpatient Medications   Medication Sig Dispense Refill     Acetaminophen (TYLENOL PO)        Aspirin-Acetaminophen-Caffeine (EXCEDRIN PO)        gabapentin (NEURONTIN) 300 MG capsule Take 1 capsule (300 mg) by mouth 3 times daily 270 capsule 3       Medical History: any changes in medical history since they were last seen? No    Review of Systems:  The 14 system ROS was reviewed from the intake questionnaire, and is positive for: leg pain, stiffness  Any bowel or bladder problems: denies  Mood: denies    Physical Exam:  Blood pressure 124/78, pulse 62, height 1.753 m (5' 9\"), weight 116.1 kg (256 lb), SpO2 97 %, not currently breastfeeding.  General: NAD, pleasant  Gait: Normal  MSK exam: Lumbar ROM is mildly reduced in all planes. Strength is 5/5 and symmetric. Sensation is mildly reduced in the right foot laterally. Reflexes are symmetric and intact.    Assessment:  Mrs. Dunlap is a 49 year old with past medical history including: Obesity who presents for Follow up and treatment of the followin. Chronic right leg pain: She reports a 20 year history of chronic right leg pain prior to which she underwent a L4-5 discectomy/laminectomy (27 years ago) which helped significantly with chronic back pain. The right leg pain has been persistent over the years and slowly progressing. MRI shows a broad disc protrusion at L4-5 which " may be contacting/irritating the traversing L5 nerve at this level causing her symptoms. Based on history and exam the different for her symptoms includes: L5 radiculopathy, sciatica caused by piriformis dysfunction, chronic myofascial pain. Initial eval was to discuss SCS but we decided to proceed with an TAYLOR and other conservative options first. She had significant improvement with a Right L5 taylor completed on 1/13/2020 and use of gabapentin. She will continue this and PT for the time being and follow up in clinic as needed.    Mental Health - the patient's mental health concerns, specifically anxiety, affect her experience of pain and contribute to her clinically significant distress.           Plan:  The following recommendations were given to the patient. Diagnosis, treatment options, risks, benefits, and alternatives were discussed, and all questions were answered. The patient expressed understanding of the plan for management.      I am recommending a multidisciplinary treatment plan to help this patient better manage her pain.  This includes:      1. Physical Therapy: Continue working with chronic pain PT.  2. Clinical Health Pain Psychologist: Finished working with pain phd.  3. Self Care Recommendations: Recommended a gradual gentle progressive exercise that does not increase pain - gradually increase daily walking program.  Take mini breaks - 5 minutes of mindfullness a couple times a day.   4. Diagnostic Studies: None, lumbar MRI reviewed with patient  5. Medication Management:   1. Continue gabapentin 600mg TID  6. Further procedures recommended: Right L5 taylor can be repeated every 3+ months as needed.  7. Follow up: as needed in clinic       I spent 20 minutes of time face to face with the patient.  Greater than 50% of this time was spent in patient counseling  and/or coordination of care.      Joseph Rebollar DO  [facility]  2/20/2020

## 2020-02-21 ENCOUNTER — OFFICE VISIT (OUTPATIENT)
Dept: PALLIATIVE MEDICINE | Facility: CLINIC | Age: 50
End: 2020-02-21
Payer: COMMERCIAL

## 2020-02-21 DIAGNOSIS — G89.4 CHRONIC PAIN SYNDROME: ICD-10-CM

## 2020-02-21 DIAGNOSIS — M54.16 LUMBAR RADICULOPATHY: Primary | ICD-10-CM

## 2020-02-21 DIAGNOSIS — M79.661 PAIN OF RIGHT LOWER LEG: ICD-10-CM

## 2020-02-21 PROCEDURE — 97112 NEUROMUSCULAR REEDUCATION: CPT | Mod: GP | Performed by: PHYSICAL THERAPIST

## 2020-02-21 NOTE — PROGRESS NOTES
"PAIN PHYSICAL THERAPY PROGRESS NOTE  Patient Name: Taylor Dunlap      YOB: 1970     Medical Record Number: [id number]  Diagnosis:    Lumbar radiculopathy  Pain of right lower leg  Chronic pain syndrome    Visit: 5/6  Chief Complaint: right leg pain    Per pain provider assessment:  She reports a 20 year history of chronic right leg pain prior to which she underwent a L4-5 discectomy/laminectomy (27 years ago) which helped significantly with chronic back pain. The right leg pain has been persistent over the years and slowly progressing. MRI shows a broad disc protrusion at L4-5 which may be contacting/irritating the traversing L5 nerve at this level causing her symptoms. Based on history and exam the different for her symptoms includes: L5 radiculopathy, sciatica caused by piriformis dysfunction, chronic myofascial pain.   Onset: jan 11, 1993  Location/Radiation: back, now right leg  Quality: \"constant dull pain with times of sharp pain\" \"constant\"  Severity/Intensity: 8/10 at worst, 2/10 at best, 4/10 on average  Fatigue level: 5/10 average  Aggravating factors include: \"standing too long - walking\" \"Sitting too long- cleaning my house)  Relieving factors include: \"laying down    Patient's goals for physical therapy: be able to cook a meal, have a walking program, improve mood and have more energy     Subjective: Pt reports she is feeling better overall.  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  We discussed the importance of mini breaks and pacing while returning to previous activities.  While discussing future treatment options including transition to orthopedic PT, patient became tearful and fearful as her past experience has been re-injury to her back with HEP.    Self Care  HEP: issued handouts  Walking/Aerobic Activity: issued handout, begin 10' x 2 daily; add 1 minute to " first period of walking  Posture: next  Breathing/Relaxation: cuing to breathe with movement  Mini Breaks: indep  Pacing: indep    Objective Findings:    POSTURE:  Observation: Patient demonstrates forward head, protracted shoulders and thoracic kyphosis in standing and sitting posture.  GAIT, LOCOMOTION, and BALANCE:  Gait and Locomotion: mild antalgic gait with WB on right LE  Balance: next  RANGE OF MOTION: restricted right hip/knee flexion; left hip/knee WFL  MUSCLE PERFORMANCE:   Strength: demonstrates core instability  Flexibility: tightness noted in next  FUNCTIONAL TESTING/OBSERVATION: guarded and slow mobility with chair mechanics  Pain behaviors: None      HEP:   -SKTC, hamstring, piriformis (left side only for now)  -frog legs  -beginning rotation, global and differentiated patterns  -SL LE dural glides  -Kegel's  -seated pelvic tilts    Last:  Noted L-4 FRS right and L-5 ERS left with associated protective right lumbar psps ms shortening   Noted tendency to sit with LE adduction and poor TA/pelvic floor postural support; also noted left pelvic rotation with anterior tilt due to right knee tightness  Pt reports protective muscle tightness in right low back produces tightness along right lateral knee (L-4 dermatome) especially with sitting.  Instruction in gentle Kegel's to activate core stability along with expanded abdominal breathing in sitting and during transitional movements.  Instructed in kinesthetic differentiation of segmental thoracic and lumbar spine on the right in SL position.    Noted increased muscle guarding in lower back and increased right femoral nerve tension with limited right knee flexion   Instructed in prone lying and prone on elbows; side glide pelvis to the left variation tolerated better.  Performed prone LE femoral nerve glide and right knee flexion with improved tolerance and ROM  Performed supine hamstring strap stretch with adduction nerve glide  Reinforced body awareness,  mini breaks and pacing.    Treatment Interventions:  Neuromuscular Reeducation:   For 45 minutes as above.  __________________________________________________________________    Assessment:  Ongoing Functional Limitations Include:  Patient tolerated/responded well to treatment    Intensity Level: 4 (1=low intensity; 5=high intensity)  Demonstrates/Verbalizes Technique: 4 (1= poor technique-difficulty performing exercises,significant cues required; 5= good technique-performs exercises without cues)  Body Awareness: 3 (1=low awareness; 5=high awareness)  Posture/Stability: 4 (1= poor posture, stability; 5= good posture, stability)  Motivational Level: Cooperative  Response to Teaching: cooperative  Factors that affect learning: None    _______________________________________________________________________  Plan of Care  Continue PT to support reactivation and integration of self regulation pain management skills;  Continue with prescribed plan of care - progress as tolerated.  Focus next session will be on: monitor self cares, walking and HEP (on the right); add seated strap LE stretch prior to walking; progress SL right shoulder/hip glides; will add HEP including calf stretch; core strengthening--squats, marching, bridging, clam shell as able   Present:  MARIA ELENA     Total Visit Time:  45 minutes    Therapist: Marisol Morejon, PT         Date: 2/21/2020

## 2021-01-15 ENCOUNTER — HEALTH MAINTENANCE LETTER (OUTPATIENT)
Age: 51
End: 2021-01-15

## 2021-02-12 DIAGNOSIS — M54.16 LUMBAR RADICULOPATHY: ICD-10-CM

## 2021-02-12 RX ORDER — GABAPENTIN 300 MG/1
300 CAPSULE ORAL 3 TIMES DAILY
Qty: 270 CAPSULE | Refills: 3 | Status: SHIPPED | OUTPATIENT
Start: 2021-02-12 | End: 2022-02-09

## 2021-02-12 NOTE — TELEPHONE ENCOUNTER
Routing refill request to provider for review/approval because:  Drug not on the [facility] refill protocol     Jocelyn An RN on 2/12/2021 at 9:08 AM

## 2021-03-20 ENCOUNTER — HEALTH MAINTENANCE LETTER (OUTPATIENT)
Age: 51
End: 2021-03-20

## 2021-04-17 ENCOUNTER — IMMUNIZATION (OUTPATIENT)
Dept: NURSING | Facility: CLINIC | Age: 51
End: 2021-04-17
Payer: COMMERCIAL

## 2021-04-17 PROCEDURE — 91301 PR COVID VAC MODERNA 100 MCG/0.5 ML IM: CPT

## 2021-04-17 PROCEDURE — 0011A PR COVID VAC MODERNA 100 MCG/0.5 ML IM: CPT

## 2021-05-15 ENCOUNTER — IMMUNIZATION (OUTPATIENT)
Dept: NURSING | Facility: CLINIC | Age: 51
End: 2021-05-15
Attending: INTERNAL MEDICINE
Payer: COMMERCIAL

## 2021-05-15 PROCEDURE — 0012A PR COVID VAC MODERNA 100 MCG/0.5 ML IM: CPT

## 2021-05-15 PROCEDURE — 91301 PR COVID VAC MODERNA 100 MCG/0.5 ML IM: CPT

## 2021-09-04 ENCOUNTER — HEALTH MAINTENANCE LETTER (OUTPATIENT)
Age: 51
End: 2021-09-04

## 2022-03-10 DIAGNOSIS — M54.16 LUMBAR RADICULOPATHY: ICD-10-CM

## 2022-03-14 RX ORDER — GABAPENTIN 300 MG/1
CAPSULE ORAL
Qty: 90 CAPSULE | Refills: 0 | Status: SHIPPED | OUTPATIENT
Start: 2022-03-14 | End: 2022-04-13

## 2022-04-10 ENCOUNTER — HEALTH MAINTENANCE LETTER (OUTPATIENT)
Age: 52
End: 2022-04-10

## 2022-04-13 DIAGNOSIS — M54.16 LUMBAR RADICULOPATHY: ICD-10-CM

## 2022-04-13 RX ORDER — GABAPENTIN 300 MG/1
CAPSULE ORAL
Qty: 90 CAPSULE | Refills: 0 | Status: SHIPPED | OUTPATIENT
Start: 2022-04-13 | End: 2022-05-11

## 2022-05-10 DIAGNOSIS — M54.16 LUMBAR RADICULOPATHY: ICD-10-CM

## 2022-05-10 NOTE — TELEPHONE ENCOUNTER
Routing refill request to provider for review/approval because:  Drug not on the [facility] refill protocol     Jocelyn An RN on 5/10/2022 at 2:27 PM

## 2022-05-11 RX ORDER — GABAPENTIN 300 MG/1
CAPSULE ORAL
Qty: 90 CAPSULE | Refills: 0 | Status: SHIPPED | OUTPATIENT
Start: 2022-05-11 | End: 2022-06-07

## 2022-05-11 NOTE — TELEPHONE ENCOUNTER
Gave her one month -  Have not seen in 1.5 years.   She is overdue for physical and med check.   Please call her and help her get set up for this

## 2022-06-07 DIAGNOSIS — M54.16 LUMBAR RADICULOPATHY: ICD-10-CM

## 2022-06-07 RX ORDER — GABAPENTIN 300 MG/1
CAPSULE ORAL
Qty: 90 CAPSULE | Refills: 1 | Status: SHIPPED | OUTPATIENT
Start: 2022-06-07 | End: 2022-07-20

## 2022-07-13 SDOH — HEALTH STABILITY: PHYSICAL HEALTH: ON AVERAGE, HOW MANY MINUTES DO YOU ENGAGE IN EXERCISE AT THIS LEVEL?: 10 MIN

## 2022-07-13 SDOH — ECONOMIC STABILITY: TRANSPORTATION INSECURITY
IN THE PAST 12 MONTHS, HAS THE LACK OF TRANSPORTATION KEPT YOU FROM MEDICAL APPOINTMENTS OR FROM GETTING MEDICATIONS?: NO

## 2022-07-13 SDOH — HEALTH STABILITY: PHYSICAL HEALTH: ON AVERAGE, HOW MANY DAYS PER WEEK DO YOU ENGAGE IN MODERATE TO STRENUOUS EXERCISE (LIKE A BRISK WALK)?: 2 DAYS

## 2022-07-13 SDOH — ECONOMIC STABILITY: FOOD INSECURITY: WITHIN THE PAST 12 MONTHS, THE FOOD YOU BOUGHT JUST DIDN'T LAST AND YOU DIDN'T HAVE MONEY TO GET MORE.: NEVER TRUE

## 2022-07-13 SDOH — ECONOMIC STABILITY: TRANSPORTATION INSECURITY
IN THE PAST 12 MONTHS, HAS LACK OF TRANSPORTATION KEPT YOU FROM MEETINGS, WORK, OR FROM GETTING THINGS NEEDED FOR DAILY LIVING?: NO

## 2022-07-13 SDOH — ECONOMIC STABILITY: INCOME INSECURITY: HOW HARD IS IT FOR YOU TO PAY FOR THE VERY BASICS LIKE FOOD, HOUSING, MEDICAL CARE, AND HEATING?: NOT HARD AT ALL

## 2022-07-13 SDOH — ECONOMIC STABILITY: FOOD INSECURITY: WITHIN THE PAST 12 MONTHS, YOU WORRIED THAT YOUR FOOD WOULD RUN OUT BEFORE YOU GOT MONEY TO BUY MORE.: NEVER TRUE

## 2022-07-13 SDOH — ECONOMIC STABILITY: INCOME INSECURITY: IN THE LAST 12 MONTHS, WAS THERE A TIME WHEN YOU WERE NOT ABLE TO PAY THE MORTGAGE OR RENT ON TIME?: NO

## 2022-07-13 ASSESSMENT — ENCOUNTER SYMPTOMS
DIZZINESS: 0
HEARTBURN: 0
PALPITATIONS: 0
COUGH: 0
ABDOMINAL PAIN: 0
HEMATOCHEZIA: 0
MYALGIAS: 0
FEVER: 0
CHILLS: 0
PARESTHESIAS: 0
EYE PAIN: 0
FREQUENCY: 0
BREAST MASS: 0
WEAKNESS: 0
SORE THROAT: 0
NERVOUS/ANXIOUS: 0
JOINT SWELLING: 0
DYSURIA: 0
CONSTIPATION: 0
SHORTNESS OF BREATH: 0
NAUSEA: 0
DIARRHEA: 0
HEMATURIA: 0
ARTHRALGIAS: 0
HEADACHES: 0

## 2022-07-13 ASSESSMENT — SOCIAL DETERMINANTS OF HEALTH (SDOH)
HOW OFTEN DO YOU ATTEND CHURCH OR RELIGIOUS SERVICES?: PATIENT DECLINED
DO YOU BELONG TO ANY CLUBS OR ORGANIZATIONS SUCH AS CHURCH GROUPS UNIONS, FRATERNAL OR ATHLETIC GROUPS, OR SCHOOL GROUPS?: PATIENT DECLINED
IN A TYPICAL WEEK, HOW MANY TIMES DO YOU TALK ON THE PHONE WITH FAMILY, FRIENDS, OR NEIGHBORS?: PATIENT DECLINED
HOW OFTEN DO YOU GET TOGETHER WITH FRIENDS OR RELATIVES?: PATIENT DECLINED

## 2022-07-13 ASSESSMENT — LIFESTYLE VARIABLES
SKIP TO QUESTIONS 9-10: 0
HOW OFTEN DO YOU HAVE SIX OR MORE DRINKS ON ONE OCCASION: PATIENT DECLINED
HOW OFTEN DO YOU HAVE A DRINK CONTAINING ALCOHOL: PATIENT DECLINED
HOW MANY STANDARD DRINKS CONTAINING ALCOHOL DO YOU HAVE ON A TYPICAL DAY: PATIENT DECLINED
AUDIT-C TOTAL SCORE: -1

## 2022-07-20 ENCOUNTER — OFFICE VISIT (OUTPATIENT)
Dept: FAMILY MEDICINE | Facility: CLINIC | Age: 52
End: 2022-07-20
Payer: COMMERCIAL

## 2022-07-20 VITALS
HEART RATE: 65 BPM | OXYGEN SATURATION: 100 % | TEMPERATURE: 97.9 F | RESPIRATION RATE: 16 BRPM | SYSTOLIC BLOOD PRESSURE: 137 MMHG | BODY MASS INDEX: 34.66 KG/M2 | HEIGHT: 69 IN | DIASTOLIC BLOOD PRESSURE: 84 MMHG | WEIGHT: 234 LBS

## 2022-07-20 DIAGNOSIS — Z83.3 FAMILY HISTORY OF DIABETES MELLITUS: ICD-10-CM

## 2022-07-20 DIAGNOSIS — E66.812 CLASS 2 OBESITY DUE TO EXCESS CALORIES WITHOUT SERIOUS COMORBIDITY WITH BODY MASS INDEX (BMI) OF 35.0 TO 35.9 IN ADULT: ICD-10-CM

## 2022-07-20 DIAGNOSIS — M54.16 LUMBAR RADICULOPATHY: ICD-10-CM

## 2022-07-20 DIAGNOSIS — Z12.31 ENCOUNTER FOR SCREENING MAMMOGRAM FOR BREAST CANCER: ICD-10-CM

## 2022-07-20 DIAGNOSIS — E66.09 CLASS 2 OBESITY DUE TO EXCESS CALORIES WITHOUT SERIOUS COMORBIDITY WITH BODY MASS INDEX (BMI) OF 35.0 TO 35.9 IN ADULT: ICD-10-CM

## 2022-07-20 DIAGNOSIS — Z11.59 NEED FOR HEPATITIS C SCREENING TEST: ICD-10-CM

## 2022-07-20 DIAGNOSIS — Z12.11 SCREEN FOR COLON CANCER: ICD-10-CM

## 2022-07-20 DIAGNOSIS — Z12.31 VISIT FOR SCREENING MAMMOGRAM: ICD-10-CM

## 2022-07-20 DIAGNOSIS — Z13.6 CARDIOVASCULAR SCREENING; LDL GOAL LESS THAN 160: ICD-10-CM

## 2022-07-20 DIAGNOSIS — Z00.00 ROUTINE GENERAL MEDICAL EXAMINATION AT A HEALTH CARE FACILITY: Primary | ICD-10-CM

## 2022-07-20 DIAGNOSIS — Z11.4 SCREENING FOR HIV (HUMAN IMMUNODEFICIENCY VIRUS): ICD-10-CM

## 2022-07-20 LAB
ALBUMIN SERPL-MCNC: 4 G/DL (ref 3.4–5)
ALP SERPL-CCNC: 58 U/L (ref 40–150)
ALT SERPL W P-5'-P-CCNC: 26 U/L (ref 0–50)
ANION GAP SERPL CALCULATED.3IONS-SCNC: 6 MMOL/L (ref 3–14)
AST SERPL W P-5'-P-CCNC: 20 U/L (ref 0–45)
BILIRUB SERPL-MCNC: 0.4 MG/DL (ref 0.2–1.3)
BUN SERPL-MCNC: 13 MG/DL (ref 7–30)
CALCIUM SERPL-MCNC: 9.2 MG/DL (ref 8.5–10.1)
CHLORIDE BLD-SCNC: 108 MMOL/L (ref 94–109)
CHOLEST SERPL-MCNC: 252 MG/DL
CO2 SERPL-SCNC: 26 MMOL/L (ref 20–32)
CREAT SERPL-MCNC: 0.67 MG/DL (ref 0.52–1.04)
ERYTHROCYTE [DISTWIDTH] IN BLOOD BY AUTOMATED COUNT: 12.9 % (ref 10–15)
FASTING STATUS PATIENT QL REPORTED: YES
GFR SERPL CREATININE-BSD FRML MDRD: >90 ML/MIN/1.73M2
GLUCOSE BLD-MCNC: 109 MG/DL (ref 70–99)
HBA1C MFR BLD: 5.4 % (ref 0–5.6)
HCT VFR BLD AUTO: 40.5 % (ref 35–47)
HCV AB SERPL QL IA: NONREACTIVE
HDLC SERPL-MCNC: 82 MG/DL
HGB BLD-MCNC: 13.1 G/DL (ref 11.7–15.7)
HIV 1+2 AB+HIV1 P24 AG SERPL QL IA: NONREACTIVE
LDLC SERPL CALC-MCNC: 151 MG/DL
MCH RBC QN AUTO: 31.5 PG (ref 26.5–33)
MCHC RBC AUTO-ENTMCNC: 32.3 G/DL (ref 31.5–36.5)
MCV RBC AUTO: 97 FL (ref 78–100)
NONHDLC SERPL-MCNC: 170 MG/DL
PLATELET # BLD AUTO: 297 10E3/UL (ref 150–450)
POTASSIUM BLD-SCNC: 4.1 MMOL/L (ref 3.4–5.3)
PROT SERPL-MCNC: 7.3 G/DL (ref 6.8–8.8)
RBC # BLD AUTO: 4.16 10E6/UL (ref 3.8–5.2)
SODIUM SERPL-SCNC: 140 MMOL/L (ref 133–144)
TRIGL SERPL-MCNC: 97 MG/DL
WBC # BLD AUTO: 5.5 10E3/UL (ref 4–11)

## 2022-07-20 PROCEDURE — 86803 HEPATITIS C AB TEST: CPT | Performed by: FAMILY MEDICINE

## 2022-07-20 PROCEDURE — 36415 COLL VENOUS BLD VENIPUNCTURE: CPT | Performed by: FAMILY MEDICINE

## 2022-07-20 PROCEDURE — 91306 COVID-19,PF,MODERNA (18+ YRS BOOSTER .25ML): CPT | Performed by: FAMILY MEDICINE

## 2022-07-20 PROCEDURE — 99396 PREV VISIT EST AGE 40-64: CPT | Mod: 25 | Performed by: FAMILY MEDICINE

## 2022-07-20 PROCEDURE — 80061 LIPID PANEL: CPT | Performed by: FAMILY MEDICINE

## 2022-07-20 PROCEDURE — 87389 HIV-1 AG W/HIV-1&-2 AB AG IA: CPT | Performed by: FAMILY MEDICINE

## 2022-07-20 PROCEDURE — 83036 HEMOGLOBIN GLYCOSYLATED A1C: CPT | Performed by: FAMILY MEDICINE

## 2022-07-20 PROCEDURE — 80053 COMPREHEN METABOLIC PANEL: CPT | Performed by: FAMILY MEDICINE

## 2022-07-20 PROCEDURE — 85027 COMPLETE CBC AUTOMATED: CPT | Performed by: FAMILY MEDICINE

## 2022-07-20 PROCEDURE — 0064A COVID-19,PF,MODERNA (18+ YRS BOOSTER .25ML): CPT | Performed by: FAMILY MEDICINE

## 2022-07-20 RX ORDER — GABAPENTIN 300 MG/1
CAPSULE ORAL
Qty: 90 CAPSULE | Refills: 3 | Status: SHIPPED | OUTPATIENT
Start: 2022-07-20 | End: 2022-11-30

## 2022-07-20 ASSESSMENT — ENCOUNTER SYMPTOMS
HEADACHES: 0
NAUSEA: 0
SORE THROAT: 0
HEARTBURN: 0
FREQUENCY: 0
WEAKNESS: 0
PARESTHESIAS: 0
ABDOMINAL PAIN: 0
CONSTIPATION: 0
HEMATURIA: 0
SHORTNESS OF BREATH: 0
ARTHRALGIAS: 0
COUGH: 0
NERVOUS/ANXIOUS: 0
HEMATOCHEZIA: 0
MYALGIAS: 0
DIARRHEA: 0
FEVER: 0
PALPITATIONS: 0
DYSURIA: 0
BREAST MASS: 0
JOINT SWELLING: 0
DIZZINESS: 0
EYE PAIN: 0
CHILLS: 0

## 2022-07-20 ASSESSMENT — LIFESTYLE VARIABLES
AUDIT-C TOTAL SCORE: 5
HOW MANY STANDARD DRINKS CONTAINING ALCOHOL DO YOU HAVE ON A TYPICAL DAY: 1 OR 2
HOW OFTEN DO YOU HAVE A DRINK CONTAINING ALCOHOL: 4 OR MORE TIMES A WEEK
SKIP TO QUESTIONS 9-10: 0
HOW OFTEN DO YOU HAVE SIX OR MORE DRINKS ON ONE OCCASION: LESS THAN MONTHLY

## 2022-07-20 NOTE — PROGRESS NOTES
SUBJECTIVE:   CC: Taylor Dunlap is an 52 year old woman who presents for preventive health visit.   She has no concerns today.   She would like to get refills of her meds.      Patient has been advised of split billing requirements and indicates understanding: Yes  Healthy Habits:     Getting at least 3 servings of Calcium per day:  Yes    Bi-annual eye exam:  Yes    Dental care twice a year:  Yes    Sleep apnea or symptoms of sleep apnea:  Excessive snoring    Diet:  Regular (no restrictions)    Frequency of exercise:  1 day/week    Duration of exercise:  15-30 minutes    Taking medications regularly:  Yes    Medication side effects:  None    PHQ-2 Total Score: 0    Additional concerns today:  No        Today's PHQ-2 Score:   PHQ-2 (  [employer]) 2022   Q1: Little interest or pleasure in doing things 0   Q2: Feeling down, depressed or hopeless 0   PHQ-2 Score 0   PHQ-2 Total Score (12-17 Years)- Positive if 3 or more points; Administer PHQ-A if positive -   Q1: Little interest or pleasure in doing things Not at all   Q2: Feeling down, depressed or hopeless Not at all   PHQ-2 Score 0       Abuse: Current or Past (Physical, Sexual or Emotional) - No  Do you feel safe in your environment? Yes    Have you ever done Advance Care Planning? (For example, a Health Directive, POLST, or a discussion with a medical provider or your loved ones about your wishes): Yes, patient states has an Advance Care Planning document and will bring a copy to the clinic.    Social History     Tobacco Use     Smoking status: Former Smoker     Packs/day: 0.50     Years: 10.00     Pack years: 5.00     Types: Cigarettes     Start date: 1987     Quit date: 2009     Years since quittin.3     Smokeless tobacco: Never Used     Tobacco comment: quit 2018   Substance Use Topics     Alcohol use: Yes     Comment: occasional; couple glasses wine on weekends.     If you drink alcohol do you typically have >3 drinks per day or >7  drinks per week? Yes      Alcohol Use 2022   Prescreen: >3 drinks/day or >7 drinks/week? Yes   Prescreen: >3 drinks/day or >7 drinks/week? -   AUDIT SCORE  8       Reviewed orders with patient.  Reviewed health maintenance and updated orders accordingly - Yes  Labs reviewed in EPIC    Breast Cancer Screening:    Breast CA Risk Assessment (FHS-7) 2022   Do you have a family history of breast, colon, or ovarian cancer? No / Unknown         Mammogram Screening: Recommended annual mammography  Pertinent mammograms are reviewed under the imaging tab.    History of abnormal Pap smear: NO - age 30-65 PAP every 5 years with negative HPV co-testing recommended  PAP / HPV Latest Ref Rng & Units 2020 7/10/2013 2009   PAP (Historical) - NIL ASC-US(A) NIL   HPV16 NEG:Negative Negative - -   HPV18 NEG:Negative Negative - -   HRHPV NEG:Negative Negative - -     Past Medical History:   Diagnosis Date     Back pain     back surgery and back injections     Skin cancer, basal cell        Past Surgical History:   Procedure Laterality Date     [email or web address] NONSPECIFIC PROCEDURE      [facility]       MEDICATIONS:  Current Outpatient Medications   Medication     Acetaminophen (TYLENOL PO)     Aspirin-Acetaminophen-Caffeine (EXCEDRIN PO)     gabapentin (NEURONTIN) 300 MG capsule     No current facility-administered medications for this visit.       SOCIAL HISTORY:  Social History     Tobacco Use     Smoking status: Former Smoker     Packs/day: 0.50     Years: 10.00     Pack years: 5.00     Types: Cigarettes     Start date: 1987     Quit date: 2009     Years since quittin.3     Smokeless tobacco: Never Used     Tobacco comment: quit 2018   Substance Use Topics     Alcohol use: Yes     Comment: occasional; couple glasses wine on weekends.       Family History   Problem Relation Age of Onset     Arthritis Brother      Cerebrovascular Disease Maternal Grandfather       "Arthritis Paternal Grandmother         PARISH SIMMONS Paternal Grandfather         MI; 50's     Brain Cancer Paternal Aunt         R.A     Arthritis Paternal Aunt          Review of Systems   Constitutional: Negative for chills and fever.   HENT: Negative for congestion, ear pain, hearing loss and sore throat.    Eyes: Negative for pain and visual disturbance.   Respiratory: Negative for cough and shortness of breath.    Cardiovascular: Negative for chest pain, palpitations and peripheral edema.   Gastrointestinal: Negative for abdominal pain, constipation, diarrhea, heartburn, hematochezia and nausea.   Breasts:  Negative for tenderness, breast mass and discharge.   Genitourinary: Negative for dysuria, frequency, genital sores, hematuria, pelvic pain, urgency, vaginal bleeding and vaginal discharge.   Musculoskeletal: Negative for arthralgias, joint swelling and myalgias.   Skin: Negative for rash.   Neurological: Negative for dizziness, weakness, headaches and paresthesias.   Psychiatric/Behavioral: Negative for mood changes. The patient is not nervous/anxious.           OBJECTIVE:   /84 (BP Location: Right arm, Patient Position: Sitting, Cuff Size: Adult Large)   Pulse 65   Temp 97.9  F (36.6  C) (Oral)   Resp 16   Ht 1.74 m (5' 8.5\")   Wt 106.1 kg (234 lb)   LMP 06/29/2013   SpO2 100%   BMI 35.06 kg/m    Physical Exam  GENERAL APPEARANCE: alert, no distress and over weight  EYES: Eyes grossly normal to inspection, PERRL and conjunctivae and sclerae normal  HENT: ear canals and TM's normal, nose and mouth without ulcers or lesions, oropharynx clear and oral mucous membranes moist  NECK: no adenopathy, no asymmetry, masses, or scars and thyroid normal to palpation  RESP: lungs clear to auscultation - no rales, rhonchi or wheezes  BREAST: normal without masses, tenderness or nipple discharge and no palpable axillary masses or adenopathy  CV: regular rate and rhythm, normal S1 S2, no S3 or S4, no " murmur, click or rub, no peripheral edema and peripheral pulses strong  ABDOMEN: soft, nontender, no hepatosplenomegaly, no masses and bowel sounds normal  MS: no musculoskeletal defects are noted and gait is age appropriate without ataxia  SKIN: no suspicious lesions or rashes  NEURO: Normal strength and tone, sensory exam grossly normal, mentation intact and speech normal  PSYCH: mentation appears normal and affect normal/bright    Diagnostic Test Results:  Labs reviewed in Epic    ASSESSMENT/PLAN:   Taylor was seen today for physical.    Diagnoses and all orders for this visit:    Routine general medical examination at a health care facility  -     REVIEW OF HEALTH MAINTENANCE PROTOCOL ORDERS  -     COVID-19,PF,MODERNA (18+ YRS BOOSTER .25ML)  -     PRIMARY CARE FOLLOW-UP SCHEDULING; Future  -     Comprehensive metabolic panel (BMP + Alb, Alk Phos, ALT, AST, Total. Bili, TP)    Screen for colon cancer  -     Colonscopy Screening  Referral; Future    Screening for HIV (human immunodeficiency virus)  -     HIV Antigen Antibody Combo    Need for hepatitis C screening test  -     Hepatitis C Screen Reflex to HCV RNA Quant and Genotype    Visit for screening mammogram  -     MA SCREENING DIGITAL BILAT - Future  (s+30); Future    Encounter for screening mammogram for breast cancer  -     CBC with platelets    CARDIOVASCULAR SCREENING; LDL GOAL LESS THAN 160  -     Lipid panel reflex to direct LDL Fasting  -     Comprehensive metabolic panel (BMP + Alb, Alk Phos, ALT, AST, Total. Bili, TP)    Class 2 obesity due to excess calories without serious comorbidity with body mass index (BMI) of 35.0 to 35.9 in adult  -     Hemoglobin A1c  -     Comprehensive metabolic panel (BMP + Alb, Alk Phos, ALT, AST, Total. Bili, TP)    Family history of diabetes mellitus  -     Hemoglobin A1c  -     Comprehensive metabolic panel (BMP + Alb, Alk Phos, ALT, AST, Total. Bili, TP)    Lumbar radiculopathy - stable  -     gabapentin  "(NEURONTIN) 300 MG capsule; 3 capsules at night    Other orders  -     ZOSTER VACCINE RECOMBINANT ADJUVANTED (SHINGRIX); Future        Patient has been advised of split billing requirements and indicates understanding: Yes    COUNSELING:  Reviewed preventive health counseling, as reflected in patient instructions  Special attention given to:        Regular exercise       Healthy diet/nutrition       Immunizations    Discussed shingles vaccine             Colorectal Cancer Screening    Estimated body mass index is 35.06 kg/m  as calculated from the following:    Height as of this encounter: 1.74 m (5' 8.5\").    Weight as of this encounter: 106.1 kg (234 lb).    Weight management plan: Discussed healthy diet and exercise guidelines    She reports that she quit smoking about 13 years ago. Her smoking use included cigarettes. She started smoking about 35 years ago. She has a 5.00 pack-year smoking history. She has never used smokeless tobacco.      Counseling Resources:  ATP IV Guidelines  Pooled Cohorts Equation Calculator  Breast Cancer Risk Calculator  BRCA-Related Cancer Risk Assessment: FHS-7 Tool  FRAX Risk Assessment  ICSI Preventive Guidelines  Dietary Guidelines for Americans, 2010  USDA's MyPlate  ASA Prophylaxis  Lung CA Screening    Shereen Brooks MD  [facility]"

## 2022-08-09 ENCOUNTER — ANCILLARY PROCEDURE (OUTPATIENT)
Dept: MAMMOGRAPHY | Facility: CLINIC | Age: 52
End: 2022-08-09
Attending: FAMILY MEDICINE
Payer: COMMERCIAL

## 2022-08-09 DIAGNOSIS — Z12.31 VISIT FOR SCREENING MAMMOGRAM: ICD-10-CM

## 2022-08-09 PROCEDURE — 77067 SCR MAMMO BI INCL CAD: CPT | Mod: TC | Performed by: RADIOLOGY

## 2022-09-27 ENCOUNTER — TELEPHONE (OUTPATIENT)
Dept: GASTROENTEROLOGY | Facility: CLINIC | Age: 52
End: 2022-09-27

## 2022-09-27 NOTE — TELEPHONE ENCOUNTER
Screening Questions  BLUE  KIND OF PREP RED  LOCATION [review exclusion criteria] GREEN  SEDATION TYPE        Y Are you active on mychart?       Shereen Brooks MD in  FAMILY PRACTICE Ordering/Referring Provider?        [employer] What type of coverage do you have?      N Have you had a positive covid test in the last 90 days?     1. 35.6 BMI  [BMI 40+ - review exclusion criteria]    2. Y  Are you able to give consent for your medical care? [IF NO,RN REVIEW]        3. N  Are you taking any prescription pain medications on a routine schedule?        3a.  EXTENDED PREP What kind of prescription?   4. N Do you have any chemical dependencies such as alcohol, street drugs, or methadone?    5. N Do you have any history of post-traumatic stress syndrome, severe anxiety or history of psychosis?      **If yes 3- 5 , please schedule with MAC sedation.**          IF YES TO ANY 6 - 10 - HOSPITAL SETTING ONLY.     6.   N Do you need assistance transferring?     7.   N Have you had a heart or lung transplant?    8.   N Are you currently on dialysis?   9.   N Do you use daily home oxygen?   10. N Do you take nitroglycerin?   10a.  If yes, how often?     11. [FEMALES]  N Are you currently pregnant?    11a.  If yes, how many weeks? [ Greater than 12 weeks, OR NEEDED]    12. N Do you have Pulmonary Hypertension? *NEED PAC APPT AT [facility]*     13. N [review exclusion criteria]  Do you have any implantable devices in your body (pacemaker, defib, LVAD)?    14. N In the past 6 months, have you had any heart related issues including cardiomyopathy or heart attack?     14a. N If yes, did it require cardiac stenting if so when?     15. N Have you had a stroke or Transient ischemic attack (TIA - aka  mini stroke ) within 6 months?      16. N Do you have mod to severe Obstructive Sleep Apnea?  [Hospital only - Ok at [place]]    17. N Do you have SEVERE AND UNCONTROLLED asthma? *NEED PAC APPT AT [facility]*     18. N Are you currently taking any  "blood thinners?     18a. If yes, inform patient to \"follow up w/ ordering provider for bridging instructions.\"    19. N Do you take the medication Phentermine?    19a. If yes, \"Hold for 7 days before procedure.  Please consult your prescribing provider if you have questions about holding this medication.\"     20. N  Do you have chronic kidney disease?      21. N  Do you have a diagnosis of diabetes?     22. N  On a regular basis do you go 3-5 days between bowel movements?     23.  Preferred LOCAL Pharmacy for Pre Prescription    [ LIST ONLY ONE PHARMACY]        [id number] - [place], [place] - [address] AT [facility] [address]        - CLOSING REMINDERS -    Informed patient they will need an adult    Cannot take any type of public or medical transportation alone    Conscious Sedation- Needs  for 6 hours after the procedure       MAC/General-Needs  for 24 hours after procedure    Pre-Procedure Covid test to be completed [[facility] PCR Testing Required]    Confirmed Nurse will call to complete assessment       - SCHEDULING DETAILS -     Maria Luz  Surgeon    11/9/22  Date of Procedure  Lower Endoscopy [Colonoscopy]  Type of Procedure Scheduled    Location  [facility] PREP-If you answer yes to questions #8, #20, #21Which Colonoscopy Prep was Sent?     MOD Sedation Type     N PAC / Pre-op Required         Additional comments:            "

## 2022-10-16 ENCOUNTER — HEALTH MAINTENANCE LETTER (OUTPATIENT)
Age: 52
End: 2022-10-16

## 2022-10-19 RX ORDER — BISACODYL 5 MG
TABLET, DELAYED RELEASE (ENTERIC COATED) ORAL
Qty: 4 TABLET | Refills: 0 | Status: SHIPPED | OUTPATIENT
Start: 2022-10-19

## 2022-11-09 ENCOUNTER — HOSPITAL ENCOUNTER (OUTPATIENT)
Facility: CLINIC | Age: 52
Discharge: HOME | End: 2022-11-09
Attending: INTERNAL MEDICINE | Admitting: INTERNAL MEDICINE
Payer: COMMERCIAL

## 2022-11-09 VITALS
SYSTOLIC BLOOD PRESSURE: 118 MMHG | HEART RATE: 57 BPM | RESPIRATION RATE: 16 BRPM | OXYGEN SATURATION: 95 % | TEMPERATURE: 97.9 F | DIASTOLIC BLOOD PRESSURE: 76 MMHG

## 2022-11-09 DIAGNOSIS — Z12.11 COLON CANCER SCREENING: Primary | ICD-10-CM

## 2022-11-09 LAB — COLONOSCOPY: NORMAL

## 2022-11-09 PROCEDURE — G0121 COLON CA SCRN NOT HI RSK IND: HCPCS | Performed by: INTERNAL MEDICINE

## 2022-11-09 PROCEDURE — G0500 MOD SEDAT ENDO SERVICE >5YRS: HCPCS | Performed by: INTERNAL MEDICINE

## 2022-11-09 PROCEDURE — 45378 DIAGNOSTIC COLONOSCOPY: CPT | Performed by: INTERNAL MEDICINE

## 2022-11-09 PROCEDURE — 250N000011 HC RX IP 250 OP 636: Performed by: INTERNAL MEDICINE

## 2022-11-09 RX ORDER — EPINEPHRINE 1 MG/ML
0.1 INJECTION, SOLUTION INTRAMUSCULAR; SUBCUTANEOUS
Status: DISCONTINUED | OUTPATIENT
Start: 2022-11-09 | End: 2022-11-09 | Stop reason: HOSPADM

## 2022-11-09 RX ORDER — ONDANSETRON 2 MG/ML
4 INJECTION INTRAMUSCULAR; INTRAVENOUS EVERY 6 HOURS PRN
Status: DISCONTINUED | OUTPATIENT
Start: 2022-11-09 | End: 2022-11-09 | Stop reason: HOSPADM

## 2022-11-09 RX ORDER — NALOXONE HYDROCHLORIDE 0.4 MG/ML
0.2 INJECTION, SOLUTION INTRAMUSCULAR; INTRAVENOUS; SUBCUTANEOUS
Status: DISCONTINUED | OUTPATIENT
Start: 2022-11-09 | End: 2022-11-09 | Stop reason: HOSPADM

## 2022-11-09 RX ORDER — DIPHENHYDRAMINE HYDROCHLORIDE 50 MG/ML
25-50 INJECTION INTRAMUSCULAR; INTRAVENOUS
Status: DISCONTINUED | OUTPATIENT
Start: 2022-11-09 | End: 2022-11-09 | Stop reason: HOSPADM

## 2022-11-09 RX ORDER — FLUMAZENIL 0.1 MG/ML
0.2 INJECTION, SOLUTION INTRAVENOUS
Status: DISCONTINUED | OUTPATIENT
Start: 2022-11-09 | End: 2022-11-09 | Stop reason: HOSPADM

## 2022-11-09 RX ORDER — ONDANSETRON 4 MG/1
4 TABLET, ORALLY DISINTEGRATING ORAL EVERY 6 HOURS PRN
Status: DISCONTINUED | OUTPATIENT
Start: 2022-11-09 | End: 2022-11-09 | Stop reason: HOSPADM

## 2022-11-09 RX ORDER — PROCHLORPERAZINE MALEATE 10 MG
10 TABLET ORAL EVERY 6 HOURS PRN
Status: DISCONTINUED | OUTPATIENT
Start: 2022-11-09 | End: 2022-11-09 | Stop reason: HOSPADM

## 2022-11-09 RX ORDER — ATROPINE SULFATE 0.1 MG/ML
1 INJECTION INTRAVENOUS
Status: DISCONTINUED | OUTPATIENT
Start: 2022-11-09 | End: 2022-11-09 | Stop reason: HOSPADM

## 2022-11-09 RX ORDER — ONDANSETRON 2 MG/ML
4 INJECTION INTRAMUSCULAR; INTRAVENOUS
Status: DISCONTINUED | OUTPATIENT
Start: 2022-11-09 | End: 2022-11-09 | Stop reason: HOSPADM

## 2022-11-09 RX ORDER — NALOXONE HYDROCHLORIDE 0.4 MG/ML
0.4 INJECTION, SOLUTION INTRAMUSCULAR; INTRAVENOUS; SUBCUTANEOUS
Status: DISCONTINUED | OUTPATIENT
Start: 2022-11-09 | End: 2022-11-09 | Stop reason: HOSPADM

## 2022-11-09 RX ORDER — SIMETHICONE 40MG/0.6ML
133 SUSPENSION, DROPS(FINAL DOSAGE FORM)(ML) ORAL
Status: DISCONTINUED | OUTPATIENT
Start: 2022-11-09 | End: 2022-11-09 | Stop reason: HOSPADM

## 2022-11-09 RX ORDER — FENTANYL CITRATE 0.05 MG/ML
50-100 INJECTION, SOLUTION INTRAMUSCULAR; INTRAVENOUS EVERY 5 MIN PRN
Status: DISCONTINUED | OUTPATIENT
Start: 2022-11-09 | End: 2022-11-09 | Stop reason: HOSPADM

## 2022-11-09 RX ORDER — LIDOCAINE 40 MG/G
CREAM TOPICAL
Status: DISCONTINUED | OUTPATIENT
Start: 2022-11-09 | End: 2022-11-09 | Stop reason: HOSPADM

## 2022-11-09 RX ADMIN — FENTANYL CITRATE 100 MCG: 50 INJECTION INTRAMUSCULAR; INTRAVENOUS at 10:01

## 2022-11-09 RX ADMIN — MIDAZOLAM HYDROCHLORIDE 2 MG: 1 INJECTION, SOLUTION INTRAMUSCULAR; INTRAVENOUS at 10:01

## 2022-11-09 NOTE — DISCHARGE INSTRUCTIONS

## 2022-11-09 NOTE — H&P
Pre-Endoscopy History and Physical     Taylor Dunlap MRN# [id number]   YOB: 1970 Age: 52 year old     Date of Procedure: 2022  Primary care provider: Shereen Brooks  Type of Endoscopy: Colonoscopy with possible biopsy, possible polypectomy  Reason for Procedure: screen  Type of Anesthesia Anticipated: Conscious Sedation    HPI:    Taylor is a 52 year old female who will be undergoing the above procedure.      A history and physical has been performed. The patient's medications and allergies have been reviewed. The risks and benefits of the procedure and the sedation options and risks were discussed with the patient.  All questions were answered and informed consent was obtained.      She denies a personal or family history of anesthesia complications or bleeding disorders.     Patient Active Problem List   Diagnosis     Sciatica     Obesity     CARDIOVASCULAR SCREENING; LDL GOAL LESS THAN 160     Knee pain        Past Medical History:   Diagnosis Date     Back pain     back surgery and back injections     Ganglion cyst of wrist, left 2020     Skin cancer, basal cell         Past Surgical History:   Procedure Laterality Date     [facility] NONSPECIFIC PROCEDURE      L4-5 laminectomy & microdiscectomy ([facility])       Social History     Tobacco Use     Smoking status: Former     Packs/day: 0.50     Years: 10.00     Pack years: 5.00     Types: Cigarettes     Start date: 1987     Quit date: 2009     Years since quittin.6     Smokeless tobacco: Never     Tobacco comments:     quit 2018   Substance Use Topics     Alcohol use: Yes     Comment: occasional; couple glasses wine on weekends.       Family History   Problem Relation Age of Onset     Cerebrovascular Disease Maternal Grandfather      Arthritis Paternal Grandmother         PARISH SIMMONS Paternal Grandfather         MI; 50's     Arthritis Brother      Brain Cancer Paternal Aunt         R.A     Arthritis Paternal Aunt       "Colon Cancer No family hx of        Prior to Admission medications    Medication Sig Start Date End Date Taking? Authorizing Provider   Acetaminophen (TYLENOL PO)    Yes Reported, Patient   Aspirin-Acetaminophen-Caffeine (EXCEDRIN PO)    Yes Reported, Patient   bisacodyl (DULCOLAX) 5 MG EC tablet Take 2 tablets at 3 pm the day before your procedure. If your procedure is before 11 am, take 2 additional tablets at 11 pm. If your procedure is after 11 am, take 2 additional tablets at 6 am. For additional instructions refer to your colonoscopy prep instructions. 10/19/22  Yes Glenn Braga MD   gabapentin (NEURONTIN) 300 MG capsule 3 capsules at night 7/20/22  Yes Shereen Brooks MD   polyethylene glycol (GOLYTELY) 236 g suspension The night before the exam at 6 pm drink an 8-ounce glass every 15 minutes until the jug is half empty. If you arrive before 11 AM: Drink the other half of the Golytely jug at 11 PM night before procedure. If you arrive after 11 AM: Drink the other half of the Golytely jug at 6 AM day of procedure. For additional instructions refer to your colonoscopy prep instructions. 10/19/22  Yes Glenn Braga MD       Allergies   Allergen Reactions     No Known Drug Allergies         REVIEW OF SYSTEMS:   5 point ROS negative except as noted above in HPI, including Gen., Resp., CV, GI &  system review.    PHYSICAL EXAM:   [facility] 06/29/2013  Estimated body mass index is 35.06 kg/m  as calculated from the following:    Height as of 7/20/22: 1.74 m (5' 8.5\").    Weight as of 7/20/22: 106.1 kg (234 lb).   GENERAL APPEARANCE: alert, and oriented  MENTAL STATUS: alert  AIRWAY EXAM: Mallampatti Class I (visualization of the soft palate, fauces, uvula, anterior and posterior pillars)  RESP: lungs clear to auscultation - no rales, rhonchi or wheezes  CV: regular rates and rhythm  DIAGNOSTICS:    Not indicated    IMPRESSION   ASA Class 2 - Mild systemic disease    PLAN:   Plan for Colonoscopy with possible " biopsy, possible polypectomy. We discussed the risks, benefits and alternatives and the patient wished to proceed.    The above has been forwarded to the consulting provider.      Signed Electronically by: Glenn Braga MD  November 9, 2022

## 2022-11-30 DIAGNOSIS — M54.16 LUMBAR RADICULOPATHY: ICD-10-CM

## 2022-11-30 RX ORDER — GABAPENTIN 300 MG/1
CAPSULE ORAL
Qty: 90 CAPSULE | Refills: 3 | Status: SHIPPED | OUTPATIENT
Start: 2022-11-30 | End: 2023-03-02

## 2023-03-02 DIAGNOSIS — M54.16 LUMBAR RADICULOPATHY: ICD-10-CM

## 2023-03-02 RX ORDER — GABAPENTIN 300 MG/1
CAPSULE ORAL
Qty: 90 CAPSULE | Refills: 3 | Status: SHIPPED | OUTPATIENT
Start: 2023-03-02

## 2023-06-20 ENCOUNTER — PATIENT OUTREACH (OUTPATIENT)
Dept: CARE COORDINATION | Facility: CLINIC | Age: 53
End: 2023-06-20
Payer: COMMERCIAL

## 2023-07-04 ENCOUNTER — PATIENT OUTREACH (OUTPATIENT)
Dept: CARE COORDINATION | Facility: CLINIC | Age: 53
End: 2023-07-04
Payer: COMMERCIAL

## 2023-07-10 ENCOUNTER — PATIENT OUTREACH (OUTPATIENT)
Dept: CARE COORDINATION | Facility: CLINIC | Age: 53
End: 2023-07-10
Payer: COMMERCIAL

## 2023-08-07 ENCOUNTER — PATIENT OUTREACH (OUTPATIENT)
Dept: CARE COORDINATION | Facility: CLINIC | Age: 53
End: 2023-08-07
Payer: COMMERCIAL

## 2023-08-26 ENCOUNTER — HEALTH MAINTENANCE LETTER (OUTPATIENT)
Age: 53
End: 2023-08-26

## 2023-11-04 ENCOUNTER — HEALTH MAINTENANCE LETTER (OUTPATIENT)
Age: 53
End: 2023-11-04

## 2024-10-19 ENCOUNTER — HEALTH MAINTENANCE LETTER (OUTPATIENT)
Age: 54
End: 2024-10-19

## 2025-01-07 ENCOUNTER — APPOINTMENT (OUTPATIENT)
Age: 55
Setting detail: DERMATOLOGY
End: 2025-01-07

## 2025-01-07 DIAGNOSIS — D22 MELANOCYTIC NEVI: ICD-10-CM

## 2025-01-07 DIAGNOSIS — Z85.828 PERSONAL HISTORY OF OTHER MALIGNANT NEOPLASM OF SKIN: ICD-10-CM | Status: STABLE

## 2025-01-07 DIAGNOSIS — L57.0 ACTINIC KERATOSIS: ICD-10-CM | Status: RESOLVED

## 2025-01-07 DIAGNOSIS — Z71.89 OTHER SPECIFIED COUNSELING: ICD-10-CM

## 2025-01-07 DIAGNOSIS — Z87.2 PERSONAL HISTORY OF DISEASES OF THE SKIN AND SUBCUTANEOUS TISSUE: ICD-10-CM | Status: STABLE

## 2025-01-07 DIAGNOSIS — D18.0 HEMANGIOMA: ICD-10-CM

## 2025-01-07 DIAGNOSIS — L82.1 OTHER SEBORRHEIC KERATOSIS: ICD-10-CM

## 2025-01-07 PROBLEM — D22.5 MELANOCYTIC NEVI OF TRUNK: Status: ACTIVE | Noted: 2025-01-07

## 2025-01-07 PROBLEM — D18.01 HEMANGIOMA OF SKIN AND SUBCUTANEOUS TISSUE: Status: ACTIVE | Noted: 2025-01-07

## 2025-01-07 PROCEDURE — ? COUNSELING

## 2025-01-07 PROCEDURE — 99213 OFFICE O/P EST LOW 20 MIN: CPT

## 2025-01-07 PROCEDURE — ? SUNSCREEN RECOMMENDATIONS

## 2025-01-07 PROCEDURE — ? OBSERVATION

## 2025-01-07 ASSESSMENT — LOCATION DETAILED DESCRIPTION DERM
LOCATION DETAILED: LEFT INFERIOR MEDIAL FOREHEAD
LOCATION DETAILED: EPIGASTRIC SKIN
LOCATION DETAILED: LEFT ANTERIOR SHOULDER
LOCATION DETAILED: RIGHT SUPERIOR MEDIAL MIDBACK
LOCATION DETAILED: INFERIOR THORACIC SPINE
LOCATION DETAILED: RIGHT POSTERIOR SHOULDER
LOCATION DETAILED: SUPERIOR THORACIC SPINE
LOCATION DETAILED: MIDDLE STERNUM

## 2025-01-07 ASSESSMENT — LOCATION SIMPLE DESCRIPTION DERM
LOCATION SIMPLE: RIGHT LOWER BACK
LOCATION SIMPLE: LEFT SHOULDER
LOCATION SIMPLE: LEFT FOREHEAD
LOCATION SIMPLE: CHEST
LOCATION SIMPLE: UPPER BACK
LOCATION SIMPLE: RIGHT SHOULDER
LOCATION SIMPLE: ABDOMEN

## 2025-01-07 ASSESSMENT — LOCATION ZONE DERM
LOCATION ZONE: FACE
LOCATION ZONE: ARM
LOCATION ZONE: TRUNK

## 2025-01-07 NOTE — HPI: FULL BODY SKIN EXAMINATION
What Type Of Note Output Would You Prefer (Optional)?: Bullet Format
What Is The Reason For Today's Visit?: Full Body Skin Examination
What Is The Reason For Today's Visit? (Being Monitored For X): concerning skin lesions on an annual basis
Additional History: She has a history basal cell carcinoma on the left superior shoulder and the right superior shoulder. She has a history of dysplastic nevus on the right posterior shoulder and the left upper abdomen. No actinic keratoses lesions were treated at her last visit. No areas of concerns today.

## 2025-01-07 NOTE — PROCEDURE: COUNSELING
Detail Level: Generalized
Detail Level: Detailed
Detail Level: Zone
Labs/EKG/Imaging Studies/Medications

## 2025-01-07 NOTE — PROCEDURE: OBSERVATION
Body Location Override (Optional - Billing Will Still Be Based On Selected Body Map Location If Applicable): left superior shoulder and the right superior shoulder
Detail Level: Detailed
Size Of Lesion In Cm (Optional): 0
Body Location Override (Optional - Billing Will Still Be Based On Selected Body Map Location If Applicable): right posterior shoulder and the left upper abdomen

## (undated) DEVICE — KIT ENDO TURNOVER/PROCEDURE W/CLEAN A SCOPE LINERS 103888

## (undated) RX ORDER — FENTANYL CITRATE 0.05 MG/ML
INJECTION, SOLUTION INTRAMUSCULAR; INTRAVENOUS
Status: DISPENSED
Start: 2022-11-09